# Patient Record
Sex: MALE | Race: ASIAN | NOT HISPANIC OR LATINO | URBAN - METROPOLITAN AREA
[De-identification: names, ages, dates, MRNs, and addresses within clinical notes are randomized per-mention and may not be internally consistent; named-entity substitution may affect disease eponyms.]

---

## 2020-08-22 ENCOUNTER — INPATIENT (INPATIENT)
Facility: HOSPITAL | Age: 60
LOS: 1 days | Discharge: HOME | End: 2020-08-24
Attending: INTERNAL MEDICINE | Admitting: INTERNAL MEDICINE
Payer: MEDICARE

## 2020-08-22 ENCOUNTER — INPATIENT (INPATIENT)
Facility: HOSPITAL | Age: 60
LOS: 0 days | Discharge: ANOTHER HOSPITAL | DRG: 305 | End: 2020-08-22
Attending: STUDENT IN AN ORGANIZED HEALTH CARE EDUCATION/TRAINING PROGRAM | Admitting: STUDENT IN AN ORGANIZED HEALTH CARE EDUCATION/TRAINING PROGRAM
Payer: COMMERCIAL

## 2020-08-22 VITALS
HEART RATE: 74 BPM | RESPIRATION RATE: 16 BRPM | TEMPERATURE: 98 F | WEIGHT: 137.79 LBS | SYSTOLIC BLOOD PRESSURE: 159 MMHG | DIASTOLIC BLOOD PRESSURE: 93 MMHG | HEIGHT: 66.93 IN | OXYGEN SATURATION: 98 %

## 2020-08-22 VITALS
OXYGEN SATURATION: 100 % | RESPIRATION RATE: 19 BRPM | HEIGHT: 66 IN | TEMPERATURE: 98 F | SYSTOLIC BLOOD PRESSURE: 139 MMHG | DIASTOLIC BLOOD PRESSURE: 77 MMHG | HEART RATE: 70 BPM | WEIGHT: 138.89 LBS

## 2020-08-22 VITALS
DIASTOLIC BLOOD PRESSURE: 69 MMHG | SYSTOLIC BLOOD PRESSURE: 113 MMHG | RESPIRATION RATE: 20 BRPM | HEART RATE: 63 BPM | OXYGEN SATURATION: 99 %

## 2020-08-22 DIAGNOSIS — I21.4 NON-ST ELEVATION (NSTEMI) MYOCARDIAL INFARCTION: ICD-10-CM

## 2020-08-22 DIAGNOSIS — I25.10 ATHEROSCLEROTIC HEART DISEASE OF NATIVE CORONARY ARTERY WITHOUT ANGINA PECTORIS: ICD-10-CM

## 2020-08-22 DIAGNOSIS — F17.200 NICOTINE DEPENDENCE, UNSPECIFIED, UNCOMPLICATED: ICD-10-CM

## 2020-08-22 DIAGNOSIS — Z29.9 ENCOUNTER FOR PROPHYLACTIC MEASURES, UNSPECIFIED: ICD-10-CM

## 2020-08-22 DIAGNOSIS — I10 ESSENTIAL (PRIMARY) HYPERTENSION: ICD-10-CM

## 2020-08-22 DIAGNOSIS — E11.9 TYPE 2 DIABETES MELLITUS WITHOUT COMPLICATIONS: ICD-10-CM

## 2020-08-22 DIAGNOSIS — K21.9 GASTRO-ESOPHAGEAL REFLUX DISEASE WITHOUT ESOPHAGITIS: ICD-10-CM

## 2020-08-22 DIAGNOSIS — E78.5 HYPERLIPIDEMIA, UNSPECIFIED: ICD-10-CM

## 2020-08-22 LAB
ALBUMIN SERPL ELPH-MCNC: 3.5 G/DL — SIGNIFICANT CHANGE UP (ref 3.5–5)
ALP SERPL-CCNC: 69 U/L — SIGNIFICANT CHANGE UP (ref 40–120)
ALT FLD-CCNC: 21 U/L DA — SIGNIFICANT CHANGE UP (ref 10–60)
ANION GAP SERPL CALC-SCNC: 7 MMOL/L — SIGNIFICANT CHANGE UP (ref 5–17)
APTT BLD: 35.4 SEC — SIGNIFICANT CHANGE UP (ref 27.5–35.5)
AST SERPL-CCNC: 27 U/L — SIGNIFICANT CHANGE UP (ref 10–40)
BASOPHILS # BLD AUTO: 0.07 K/UL — SIGNIFICANT CHANGE UP (ref 0–0.2)
BASOPHILS NFR BLD AUTO: 0.8 % — SIGNIFICANT CHANGE UP (ref 0–2)
BILIRUB SERPL-MCNC: 0.6 MG/DL — SIGNIFICANT CHANGE UP (ref 0.2–1.2)
BUN SERPL-MCNC: 8 MG/DL — SIGNIFICANT CHANGE UP (ref 7–18)
CALCIUM SERPL-MCNC: 8.8 MG/DL — SIGNIFICANT CHANGE UP (ref 8.4–10.5)
CHLORIDE SERPL-SCNC: 102 MMOL/L — SIGNIFICANT CHANGE UP (ref 96–108)
CO2 SERPL-SCNC: 25 MMOL/L — SIGNIFICANT CHANGE UP (ref 22–31)
CREAT SERPL-MCNC: 1.03 MG/DL — SIGNIFICANT CHANGE UP (ref 0.5–1.3)
D DIMER BLD IA.RAPID-MCNC: <150 NG/ML DDU — SIGNIFICANT CHANGE UP
EOSINOPHIL # BLD AUTO: 0.22 K/UL — SIGNIFICANT CHANGE UP (ref 0–0.5)
EOSINOPHIL NFR BLD AUTO: 2.4 % — SIGNIFICANT CHANGE UP (ref 0–6)
GLUCOSE BLDC GLUCOMTR-MCNC: 147 MG/DL — HIGH (ref 70–99)
GLUCOSE SERPL-MCNC: 142 MG/DL — HIGH (ref 70–99)
HCT VFR BLD CALC: 37.2 % — LOW (ref 39–50)
HGB BLD-MCNC: 12 G/DL — LOW (ref 13–17)
IMM GRANULOCYTES NFR BLD AUTO: 0.2 % — SIGNIFICANT CHANGE UP (ref 0–1.5)
INR BLD: 1.01 RATIO — SIGNIFICANT CHANGE UP (ref 0.88–1.16)
LYMPHOCYTES # BLD AUTO: 1.68 K/UL — SIGNIFICANT CHANGE UP (ref 1–3.3)
LYMPHOCYTES # BLD AUTO: 18 % — SIGNIFICANT CHANGE UP (ref 13–44)
MCHC RBC-ENTMCNC: 27.3 PG — SIGNIFICANT CHANGE UP (ref 27–34)
MCHC RBC-ENTMCNC: 32.3 GM/DL — SIGNIFICANT CHANGE UP (ref 32–36)
MCV RBC AUTO: 84.7 FL — SIGNIFICANT CHANGE UP (ref 80–100)
MONOCYTES # BLD AUTO: 0.59 K/UL — SIGNIFICANT CHANGE UP (ref 0–0.9)
MONOCYTES NFR BLD AUTO: 6.3 % — SIGNIFICANT CHANGE UP (ref 2–14)
NEUTROPHILS # BLD AUTO: 6.73 K/UL — SIGNIFICANT CHANGE UP (ref 1.8–7.4)
NEUTROPHILS NFR BLD AUTO: 72.3 % — SIGNIFICANT CHANGE UP (ref 43–77)
NRBC # BLD: 0 /100 WBCS — SIGNIFICANT CHANGE UP (ref 0–0)
NT-PROBNP SERPL-SCNC: 488 PG/ML — HIGH (ref 0–125)
PLATELET # BLD AUTO: 203 K/UL — SIGNIFICANT CHANGE UP (ref 150–400)
POTASSIUM SERPL-MCNC: 4.2 MMOL/L — SIGNIFICANT CHANGE UP (ref 3.5–5.3)
POTASSIUM SERPL-SCNC: 4.2 MMOL/L — SIGNIFICANT CHANGE UP (ref 3.5–5.3)
PROT SERPL-MCNC: 7 G/DL — SIGNIFICANT CHANGE UP (ref 6–8.3)
PROTHROM AB SERPL-ACNC: 11.8 SEC — SIGNIFICANT CHANGE UP (ref 10.6–13.6)
RBC # BLD: 4.39 M/UL — SIGNIFICANT CHANGE UP (ref 4.2–5.8)
RBC # FLD: 12.7 % — SIGNIFICANT CHANGE UP (ref 10.3–14.5)
SARS-COV-2 RNA SPEC QL NAA+PROBE: SIGNIFICANT CHANGE UP
SODIUM SERPL-SCNC: 134 MMOL/L — LOW (ref 135–145)
TROPONIN I SERPL-MCNC: 1.39 NG/ML — HIGH (ref 0–0.04)
WBC # BLD: 9.31 K/UL — SIGNIFICANT CHANGE UP (ref 3.8–10.5)
WBC # FLD AUTO: 9.31 K/UL — SIGNIFICANT CHANGE UP (ref 3.8–10.5)

## 2020-08-22 PROCEDURE — 87635 SARS-COV-2 COVID-19 AMP PRB: CPT

## 2020-08-22 PROCEDURE — 84484 ASSAY OF TROPONIN QUANT: CPT

## 2020-08-22 PROCEDURE — 92941 PRQ TRLML REVSC TOT OCCL AMI: CPT | Mod: LD

## 2020-08-22 PROCEDURE — 99223 1ST HOSP IP/OBS HIGH 75: CPT | Mod: GC

## 2020-08-22 PROCEDURE — 93005 ELECTROCARDIOGRAM TRACING: CPT

## 2020-08-22 PROCEDURE — 85730 THROMBOPLASTIN TIME PARTIAL: CPT

## 2020-08-22 PROCEDURE — 71046 X-RAY EXAM CHEST 2 VIEWS: CPT

## 2020-08-22 PROCEDURE — 86769 SARS-COV-2 COVID-19 ANTIBODY: CPT

## 2020-08-22 PROCEDURE — 83880 ASSAY OF NATRIURETIC PEPTIDE: CPT

## 2020-08-22 PROCEDURE — 93458 L HRT ARTERY/VENTRICLE ANGIO: CPT | Mod: 26,59

## 2020-08-22 PROCEDURE — 85610 PROTHROMBIN TIME: CPT

## 2020-08-22 PROCEDURE — 36415 COLL VENOUS BLD VENIPUNCTURE: CPT

## 2020-08-22 PROCEDURE — 99223 1ST HOSP IP/OBS HIGH 75: CPT

## 2020-08-22 PROCEDURE — 85027 COMPLETE CBC AUTOMATED: CPT

## 2020-08-22 PROCEDURE — 96374 THER/PROPH/DIAG INJ IV PUSH: CPT

## 2020-08-22 PROCEDURE — 99285 EMERGENCY DEPT VISIT HI MDM: CPT

## 2020-08-22 PROCEDURE — 99053 MED SERV 10PM-8AM 24 HR FAC: CPT

## 2020-08-22 PROCEDURE — 80053 COMPREHEN METABOLIC PANEL: CPT

## 2020-08-22 PROCEDURE — 71046 X-RAY EXAM CHEST 2 VIEWS: CPT | Mod: 26

## 2020-08-22 PROCEDURE — 96375 TX/PRO/DX INJ NEW DRUG ADDON: CPT

## 2020-08-22 PROCEDURE — 99285 EMERGENCY DEPT VISIT HI MDM: CPT | Mod: 25

## 2020-08-22 PROCEDURE — 85379 FIBRIN DEGRADATION QUANT: CPT

## 2020-08-22 RX ORDER — OMEPRAZOLE 10 MG/1
1 CAPSULE, DELAYED RELEASE ORAL
Qty: 0 | Refills: 0 | DISCHARGE

## 2020-08-22 RX ORDER — HEPARIN SODIUM 5000 [USP'U]/ML
3800 INJECTION INTRAVENOUS; SUBCUTANEOUS ONCE
Refills: 0 | Status: COMPLETED | OUTPATIENT
Start: 2020-08-22 | End: 2020-08-22

## 2020-08-22 RX ORDER — MORPHINE SULFATE 50 MG/1
6 CAPSULE, EXTENDED RELEASE ORAL ONCE
Refills: 0 | Status: DISCONTINUED | OUTPATIENT
Start: 2020-08-22 | End: 2020-08-22

## 2020-08-22 RX ORDER — ASPIRIN/CALCIUM CARB/MAGNESIUM 324 MG
324 TABLET ORAL ONCE
Refills: 0 | Status: COMPLETED | OUTPATIENT
Start: 2020-08-22 | End: 2020-08-22

## 2020-08-22 RX ORDER — NITROGLYCERIN 6.5 MG
0.4 CAPSULE, EXTENDED RELEASE ORAL ONCE
Refills: 0 | Status: COMPLETED | OUTPATIENT
Start: 2020-08-22 | End: 2020-08-22

## 2020-08-22 RX ORDER — INSULIN LISPRO 100/ML
VIAL (ML) SUBCUTANEOUS
Refills: 0 | Status: DISCONTINUED | OUTPATIENT
Start: 2020-08-22 | End: 2020-08-24

## 2020-08-22 RX ORDER — GLUCAGON INJECTION, SOLUTION 0.5 MG/.1ML
1 INJECTION, SOLUTION SUBCUTANEOUS ONCE
Refills: 0 | Status: DISCONTINUED | OUTPATIENT
Start: 2020-08-22 | End: 2020-08-24

## 2020-08-22 RX ORDER — HEPARIN SODIUM 5000 [USP'U]/ML
3800 INJECTION INTRAVENOUS; SUBCUTANEOUS EVERY 6 HOURS
Refills: 0 | Status: DISCONTINUED | OUTPATIENT
Start: 2020-08-22 | End: 2020-08-22

## 2020-08-22 RX ORDER — DEXTROSE 50 % IN WATER 50 %
12.5 SYRINGE (ML) INTRAVENOUS ONCE
Refills: 0 | Status: DISCONTINUED | OUTPATIENT
Start: 2020-08-22 | End: 2020-08-24

## 2020-08-22 RX ORDER — HEPARIN SODIUM 5000 [USP'U]/ML
750 INJECTION INTRAVENOUS; SUBCUTANEOUS
Qty: 25000 | Refills: 0 | Status: DISCONTINUED | OUTPATIENT
Start: 2020-08-22 | End: 2020-08-22

## 2020-08-22 RX ORDER — METOPROLOL TARTRATE 50 MG
25 TABLET ORAL
Refills: 0 | Status: DISCONTINUED | OUTPATIENT
Start: 2020-08-22 | End: 2020-08-22

## 2020-08-22 RX ORDER — TICAGRELOR 90 MG/1
180 TABLET ORAL ONCE
Refills: 0 | Status: COMPLETED | OUTPATIENT
Start: 2020-08-22 | End: 2020-08-22

## 2020-08-22 RX ORDER — ATORVASTATIN CALCIUM 80 MG/1
80 TABLET, FILM COATED ORAL ONCE
Refills: 0 | Status: COMPLETED | OUTPATIENT
Start: 2020-08-22 | End: 2020-08-22

## 2020-08-22 RX ORDER — HEPARIN SODIUM 5000 [USP'U]/ML
INJECTION INTRAVENOUS; SUBCUTANEOUS
Qty: 25000 | Refills: 0 | Status: DISCONTINUED | OUTPATIENT
Start: 2020-08-22 | End: 2020-08-22

## 2020-08-22 RX ORDER — DEXTROSE 50 % IN WATER 50 %
15 SYRINGE (ML) INTRAVENOUS ONCE
Refills: 0 | Status: DISCONTINUED | OUTPATIENT
Start: 2020-08-22 | End: 2020-08-24

## 2020-08-22 RX ORDER — CHLORHEXIDINE GLUCONATE 213 G/1000ML
1 SOLUTION TOPICAL DAILY
Refills: 0 | Status: DISCONTINUED | OUTPATIENT
Start: 2020-08-22 | End: 2020-08-24

## 2020-08-22 RX ORDER — PANTOPRAZOLE SODIUM 20 MG/1
40 TABLET, DELAYED RELEASE ORAL
Refills: 0 | Status: DISCONTINUED | OUTPATIENT
Start: 2020-08-22 | End: 2020-08-24

## 2020-08-22 RX ORDER — HYDROCORTISONE 1 %
1 OINTMENT (GRAM) TOPICAL
Qty: 0 | Refills: 0 | DISCHARGE

## 2020-08-22 RX ORDER — SODIUM CHLORIDE 9 MG/ML
1000 INJECTION, SOLUTION INTRAVENOUS
Refills: 0 | Status: DISCONTINUED | OUTPATIENT
Start: 2020-08-22 | End: 2020-08-24

## 2020-08-22 RX ORDER — ASPIRIN/CALCIUM CARB/MAGNESIUM 324 MG
81 TABLET ORAL DAILY
Refills: 0 | Status: DISCONTINUED | OUTPATIENT
Start: 2020-08-22 | End: 2020-08-22

## 2020-08-22 RX ORDER — TICAGRELOR 90 MG/1
90 TABLET ORAL EVERY 12 HOURS
Refills: 0 | Status: DISCONTINUED | OUTPATIENT
Start: 2020-08-23 | End: 2020-08-24

## 2020-08-22 RX ORDER — DEXTROSE 50 % IN WATER 50 %
25 SYRINGE (ML) INTRAVENOUS ONCE
Refills: 0 | Status: DISCONTINUED | OUTPATIENT
Start: 2020-08-22 | End: 2020-08-24

## 2020-08-22 RX ORDER — INSULIN LISPRO 100/ML
VIAL (ML) SUBCUTANEOUS AT BEDTIME
Refills: 0 | Status: DISCONTINUED | OUTPATIENT
Start: 2020-08-22 | End: 2020-08-24

## 2020-08-22 RX ORDER — HEPARIN SODIUM 5000 [USP'U]/ML
5000 INJECTION INTRAVENOUS; SUBCUTANEOUS EVERY 8 HOURS
Refills: 0 | Status: DISCONTINUED | OUTPATIENT
Start: 2020-08-22 | End: 2020-08-24

## 2020-08-22 RX ORDER — ATORVASTATIN CALCIUM 80 MG/1
80 TABLET, FILM COATED ORAL AT BEDTIME
Refills: 0 | Status: DISCONTINUED | OUTPATIENT
Start: 2020-08-22 | End: 2020-08-24

## 2020-08-22 RX ORDER — ASPIRIN/CALCIUM CARB/MAGNESIUM 324 MG
81 TABLET ORAL DAILY
Refills: 0 | Status: DISCONTINUED | OUTPATIENT
Start: 2020-08-23 | End: 2020-08-24

## 2020-08-22 RX ADMIN — HEPARIN SODIUM 7.5 UNIT(S)/HR: 5000 INJECTION INTRAVENOUS; SUBCUTANEOUS at 11:55

## 2020-08-22 RX ADMIN — Medication 1 TABLET(S): at 17:09

## 2020-08-22 RX ADMIN — TICAGRELOR 180 MILLIGRAM(S): 90 TABLET ORAL at 11:55

## 2020-08-22 RX ADMIN — HEPARIN SODIUM 5000 UNIT(S): 5000 INJECTION INTRAVENOUS; SUBCUTANEOUS at 21:08

## 2020-08-22 RX ADMIN — HEPARIN SODIUM 3800 UNIT(S): 5000 INJECTION INTRAVENOUS; SUBCUTANEOUS at 07:49

## 2020-08-22 RX ADMIN — HEPARIN SODIUM 750 UNIT(S)/HR: 5000 INJECTION INTRAVENOUS; SUBCUTANEOUS at 07:48

## 2020-08-22 RX ADMIN — Medication 324 MILLIGRAM(S): at 06:59

## 2020-08-22 RX ADMIN — PANTOPRAZOLE SODIUM 40 MILLIGRAM(S): 20 TABLET, DELAYED RELEASE ORAL at 21:08

## 2020-08-22 RX ADMIN — MORPHINE SULFATE 6 MILLIGRAM(S): 50 CAPSULE, EXTENDED RELEASE ORAL at 07:46

## 2020-08-22 RX ADMIN — ATORVASTATIN CALCIUM 80 MILLIGRAM(S): 80 TABLET, FILM COATED ORAL at 09:47

## 2020-08-22 RX ADMIN — ATORVASTATIN CALCIUM 80 MILLIGRAM(S): 80 TABLET, FILM COATED ORAL at 21:08

## 2020-08-22 RX ADMIN — Medication 0.4 MILLIGRAM(S): at 06:58

## 2020-08-22 RX ADMIN — MORPHINE SULFATE 6 MILLIGRAM(S): 50 CAPSULE, EXTENDED RELEASE ORAL at 07:58

## 2020-08-22 NOTE — ACUTE INTERFACILITY TRANSFER NOTE - HOSPITAL COURSE
Patient is a 59 year old male AAOx3 from home, with PMH of HTN, HLD, DM, and CAD s/p 2 stents in 2005, who presented to the ED due to chest pain.  Noted to have troponin of 1.390 in ED. EKG was NSR. Started on heparin drip. Given dose of aspirin, morphine and nitro sublingual.  Admitted for NSTEMI. Plan for transfer to [facility] CCU for possible cardiac cath.

## 2020-08-22 NOTE — H&P ADULT - HISTORY OF PRESENT ILLNESS
Patient is a 59 year old male with PMH of HTN, HLD, DM, and CAD s/p 2 stents in 2005, who presented to [facility] due to chest pain. Patient states that the chest pain started around 4pm yesterday and radiated to both arms but pain worse on left compared to right. States having associated dizziness as well. States he had chest pain like this previously which led to him getting stents. EKG 12 lead done in ER at OSH revealing ST depressions in V4, V5, V6  with elevation in troponin I. Patient was initiated on ACS protocol, heparin gtt and asa load of 325. Patient was urgently transferred to [facility] CCU for further evaluation. Patient was then loaded with brilinta 180 and taken for urgent cardiac catheterization.

## 2020-08-22 NOTE — H&P ADULT - NSICDXPASTMEDICALHX_GEN_ALL_CORE_FT
PAST MEDICAL HISTORY:  CAD (coronary artery disease) s/p 2 stents    DM (diabetes mellitus)     HLD (hyperlipidemia)     HTN (hypertension)

## 2020-08-22 NOTE — CONSULT NOTE ADULT - ASSESSMENT
Patient is a 59 year old male AAOx3 from home, with PMH of HTN, HLD, DM, and CAD s/p 2 stents in 2005, who presented to the ED due to chest pain.    Problem/Plan - 1:  ·  Problem: NSTEMI (non-ST elevated myocardial infarction).  Plan: patient presented with chest pain radiating to arms  troponin of 1.390 on admission  EKG showing NSR  started on heparin drip  -recieved aspirin,statin  -Plavix/Brilinta held at present  -For transfer to [facility] CCU for cath today-Dr Ras Ervin      Problem/Plan - 2:  ·  Problem: HTN (hypertension).  Plan: BBlockers  BP stable at present      Problem/Plan - 3:  ·  Problem: HLD (hyperlipidemia).  Plan: continue statin.   Lipid profile in AM    Problem/Plan - 4:  ·  Problem: DM (diabetes mellitus).  Plan: monitor BS   SSI.     Problem/Plan - 5:  ·  Problem: CAD (coronary artery disease).  Plan: history of 2 stents in 2005  plan for transfer to [facility] for possible cardiac cath  -TTE LV Function.     Problem/Plan - 6:  Problem: Need for prophylactic measure. Plan: patient on heparin drip.

## 2020-08-22 NOTE — H&P ADULT - ATTENDING COMMENTS
Patient is a 59y old  Male who presents with a chief complaint of chest pain from yesterday afternoon. Substernal, 8/10, radiating to left arm, pressure like, aggravated by walking, no alleviating factor noted. Denies any SOB or palpitation but complains of diaphoresis. Denies any orthopnea, PND or recent leg swelling. Patient had CAD s/p stent in 2005, was on DAPT, but plavix was held on Aug 15 by PCP. Denies fever, chills, abdominal pain, nausea, vomiting, diarrhea, constipation, dizziness. His primary cardiologist is Dr Basurto, last visit 1year ago. Patient does not remember when was last stress test done.     VS: stable    PHYSICAL EXAM:  GENERAL: NAD  NERVOUS SYSTEM:  Alert & Oriented X3, Good concentration; no focal deficit   CHEST/LUNG: Clear to auscultation bilaterally; No rales, rhonchi, wheezing, or rubs  HEART: Regular rate and rhythm; No murmurs, rubs, or gallops  ABDOMEN: Soft, Nontender, Nondistended; Bowel sounds present  EXTREMITIES:  2+ Peripheral Pulses, No clubbing, cyanosis, or edema  SKIN: No rashes or lesions    LABS:                        12.0   9.31  )-----------( 203      ( 22 Aug 2020 06:36 )             37.2     134<L>  |  102  |  8   ----------------------------<  142<H>  4.2   |  25  |  1.03    Troponin I, Serum: 1.390    Assessment and plan:  NSTEMI  CAD s/p stent in 2005  HTN  HLD  DM    Plan:   Aspirin, statin and heparin drip   Cont BB  Trend trops  Serial EKG   Sublingual NTG PRN for chest pain   Appreciate cardiology Dr Rodriguez's consult  Full code   Transferred to [facility] for cath

## 2020-08-22 NOTE — H&P ADULT - ASSESSMENT
Patient is a 59 year old male AAOx3 from home, with PMH of HTN, HLD, DM, and CAD s/p 2 stents in 2005, who presented to the ED due to chest pain. Transferred to [facility] for NSTEMI and for urgent cardiac catheterization

## 2020-08-22 NOTE — H&P ADULT - NSHPSOCIALHISTORY_GEN_ALL_CORE
Patient lives at home with his wife. States he smokes 5 cigarettes a day. Denies any alcohol or use of illicit drugs.

## 2020-08-22 NOTE — ED PROVIDER NOTE - OBJECTIVE STATEMENT
58 yo m pmh htn, dm, hld, cad s/p stents x 2, c/o CP since 4pm. Pt reports constant substernal CP radiating to b/l UE left worse than right, associated w/ dyspnea. Pt denies any n/v/d, diaphoresis, f/c/ns, abdominal pain, LE pain or swelling or other complaints.

## 2020-08-22 NOTE — CONSULT NOTE ADULT - ATTENDING COMMENTS
Patient was seen and examined by me on 08/22/2020,interim events noted,labs and radiology studies reviewed.  Iain Rodriguez MD,FACC.  [address] [place] [address].  [facility].  [phone]

## 2020-08-22 NOTE — H&P ADULT - PROBLEM SELECTOR PLAN 2
-Awaiting LHC  -Holding BB for now, will resume post cath  -Holding losartan 100 for now, will resume prior to discharge

## 2020-08-22 NOTE — CHART NOTE - NSCHARTNOTEFT_GEN_A_CORE
I was informed about patient (with chest pain and elevated troponin) by Dr Retana around 7:45 am. Requested Cardio recs before accepting admission given active chest pain and elevated troponin. Patient was signed out to Dr Godinez around 8 am, No cardio was called till that time per ED .

## 2020-08-22 NOTE — H&P ADULT - HISTORY OF PRESENT ILLNESS
Patient is a 59 year old male AAOx3 from home, with PMH of HTN, HLD, DM, and CAD s/p 2 stents in 2005, who presented to the ED due to chest pain. Patient states that the chest pain started around 4pm yesterday and radiated to both arms but pain worse on left compared to right. States having associated dizziness as well. States he had chest pain like this previously which led to him getting stents. Currently states he is feeling a little better. Denies any shortness of breath, abdominal pain, nausea or vomiting.

## 2020-08-22 NOTE — H&P ADULT - NSHPPHYSICALEXAM_GEN_ALL_CORE
PHYSICAL EXAM:    GENERAL: NAD, well-developed  HEAD:  Atraumatic, Normocephalic  EYES: EOMI, PERRLA, conjunctiva and sclera clear  NECK: Supple, No JVD  CHEST/LUNG: Clear to auscultation bilaterally; No rhonchis, rales, or wheezing  HEART: Regular rate and rhythm; S1, S2; No murmurs, rubs, or gallops  ABDOMEN: Soft, Nontender, Nondistended; Normoactive bowel sounds  EXTREMITIES:  2+ Peripheral Pulses, No clubbing, cyanosis, or edema  PSYCH: A&Ox3  NEUROLOGY: non-focal  SKIN: No rashes or lesions

## 2020-08-22 NOTE — H&P ADULT - PROBLEM SELECTOR PLAN 1
-Admit to CCU for continuous tele monitoring  -Admission labs to include CMP with mag and phos, CBC, Coags, lipid profile, HGBA1c, TSH, serial cardiac enzymes  -Patient is awaiting urgent cardiac catheterization  -s/p ASA and brilinta load, will resume asa 81 and brilinta 90 bid in am  continue lipitor 80  -Will resume BB and ARB post cardiac cath

## 2020-08-22 NOTE — ACUTE INTERFACILITY TRANSFER NOTE - PLAN OF CARE
Cardiac cath presented with chest pain  troponin elevated at 1.390  started on heparin drip  EKG showing NSR  monitor on telemetry  plan for cardiac cath at [facility] stabilize blood pressure patient takes losartan 100mg and metoprolol tartrate 50mg BID  monitor blood pressure and adjust meds as needed monitor lipid panel patient takes simvastatin 20mg at home  f/u lipid panel control blood sugars patient takes janumet  BID and glipizide XL 5mg at home  monitor blood sugars  f/u A1c

## 2020-08-22 NOTE — H&P ADULT - PROBLEM SELECTOR PLAN 1
patient presented with chest pain radiating to arms  troponin of 1.390 on admission  EKG showing NSR  started on heparin drip  placed on telemetry patient presented with chest pain radiating to arms  troponin of 1.390 on admission  EKG showing NSR  started on heparin drip  placed on telemetry  plan for transfer to [facility] for possible Cardiac cath

## 2020-08-22 NOTE — ED PROVIDER NOTE - PHYSICAL EXAMINATION
General: pt lying in stretcher, appears stated age and is not in distress  HEENT: AT/NC, pink conjunctiva, anicteric sclerae, EOMI, PERRLA, mmm  Neck: supple, full ROM, trachea midline, no JVD, no cervical LAD, no midline ttp or stepoffs  Lungs: symmetric excursion, b/l clear vesicular breath sounds with no wheezes, crackles, or rhonchi  Heart: rrr, S1, S2 normal; no S3 or S4; no murmurs or rubs  Abd: normal bowel sounds; soft, nontender; negative McBurney's point tenderness, negative Blanco's sign, no rebound, no guarding, spleen non-palpable; no hepatomegaly, no masses  Back: no midline spinal tenderness or stepoffs, no costovertebral angle tenderness  Extremities: no clubbing, cyanosis, or edema; no palpable deformities or fractures  Skin: good turgor; no rashes, petechiae, ecchymoses, or jaundice  Pulses: radial, posterior tibialis (PT), dorsalis pedis (DP) all 2+ & symmetric  Neuro: awake, alert, responsive; oriented to person, place and time; cranial nerves intact, EOMI, intact jaw movement, intact facial sensation, no facial asymmetry, hearing intact; no nystagmus, tongue midline; Motor: Normal tone in upper and lower extremities bilaterally strength 5/5; Sensory: intact

## 2020-08-22 NOTE — CONSULT NOTE ADULT - SUBJECTIVE AND OBJECTIVE BOX
DATE OF SERVICE:Patient was seen,examined and evaluated on-08/22/2020    CHIEF COMPLAINT:Chest pain    HPI:HPI:  Patient is a 59 year old male AAOx3 from home, with PMH of HTN, HLD, T2DM, and CAD s/p 2 stents in 2005, who presented to the ED due to chest pain. Patient states that the chest pain started around 4pm yesterday and radiated to both arms but pain worse on left compared to right. States having associated dizziness as well. States he had chest pain like this previously which led to him getting stents. Currently states he is feeling a little better. Denies any shortness of breath, abdominal pain, nausea or vomiting. Noted episodes interrmittent chest pain started on Heparin gtt.      PAST MEDICAL & SURGICAL HISTORY:  CAD (coronary artery disease): s/p 2 stents  DM (diabetes mellitus)  HLD (hyperlipidemia)  HTN (hypertension)      MEDICATIONS  (STANDING):  atorvastatin 80 milliGRAM(s) Oral once  heparin  Infusion.  Unit(s)/Hr (7.5 mL/Hr) IV Continuous <Continuous>    MEDICATIONS  (PRN):  heparin   Injectable 3800 Unit(s) IV Push every 6 hours PRN For aPTT less than 40      FAMILY HISTORY:  No family history of premature coronary artery disease or sudden cardiac death    SOCIAL HISTORY:  Smoking-Current Smoker  Alcohol-Denies  Ilicit Drug use-Denies    REVIEW OF SYSTEMS:  Constitutional: [ ] fever, [ ]weight loss, [ ]fatigue Activity [ ] Bedbound,[x ] Ambulates [x ] Unassisted[ ] Cane/Walker [ ] Assistence.  Eyes: [ ] visual changes  Respiratory: [x ]shortness of breath;  [ ] cough, [ ]wheezing, [ ]chills, [ ]hemoptysis  Cardiovascular: [x ] chest pain, [ ]palpitations, [ ]dizziness,  [ ]leg swelling[ ]orthopnea [ ]PND  Gastrointestinal: [ ] abdominal pain, [ ]nausea, [ ]vomiting,  [ ]diarrhea,[ ]constipation  Genitourinary: [ ] dysuria, [ ] hematuria  Neurologic: [ ] headaches [ ] tremors[ ] weakness  Skin: [ ] itching, [ ]burning, [ ] rashes  Endocrine: [ ] heat or cold intolerance  Musculoskeletal: [ ] joint pain or swelling; [ ] muscle, back, or extremity pain  Psychiatric: [ ] depression, [ ]anxiety, [ ]mood swings, or [ ]difficulty sleeping  Hematologic: [ ] easy bruising, [ ] bleeding gums       [ x] All others negative	  [ ] Unable to obtain    Vital Signs Last 24 Hrs  T(C): 36.7 (22 Aug 2020 07:19), Max: 36.7 (22 Aug 2020 06:01)  T(F): 98 (22 Aug 2020 07:19), Max: 98 (22 Aug 2020 06:01)  HR: 63 (22 Aug 2020 07:32) (63 - 74)  BP: 113/69 (22 Aug 2020 07:32) (113/69 - 159/93)  RR: 20 (22 Aug 2020 07:32) (16 - 20)  SpO2: 99% (22 Aug 2020 07:32) (97% - 99%)  I&O's Summary      PHYSICAL EXAM:  General: No acute distress BMI-21.6  HEENT: EOMI, PERRL[ ] Icteric  Neck: Supple, No JVD  Lungs: Equal air entry bilaterally; [ ] Rales [ ] Rhonchi [ ] Wheezing  Heart: Regular rate and rhythm;[x ] Murmurs-  2 /6 [x ] Systolic [ ] Diastolic [ ] Radiation,No rubs, or gallops  Abdomen: Nontender, bowel sounds present  Extremities: No clubbing, cyanosis, or edema[ ] Calf tenderness  Nervous system:  Alert & Oriented X3, no focal deficits  Psychiatric: Normal affect  Skin: No rashes or lesions      LABS:  08-22    134<L>  |  102  |  8   ----------------------------<  142<H>  4.2   |  25  |  1.03    Ca    8.8      22 Aug 2020 06:36    TPro  7.0  /  Alb  3.5  /  TBili  0.6  /  DBili  x   /  AST  27  /  ALT  21  /  AlkPhos  69  08-22    Creatinine Trend: 1.03<--                        12.0   9.31  )-----------( 203      ( 22 Aug 2020 06:36 )             37.2     PT/INR - ( 22 Aug 2020 06:36 )   PT: 11.8 sec;   INR: 1.01 ratio    PTT - ( 22 Aug 2020 06:36 )  PTT:35.4 sec    Cardiac Enzymes: CARDIAC MARKERS ( 22 Aug 2020 06:36 )  1.390 ng/mL / x     / x     / x     / x          Serum Pro-Brain Natriuretic Peptide: 488 pg/mL (08-22-20 @ 06:36)    D-Dimer Assay, Quantitative (08.22.20 @ 06:36)    D-Dimer Assay, Quantitative: <150 ng/mL DDU        ECG [my interpretation]:Sinus rhythm at 73 BPM Non specific ST abnormalities                                     Qtc 407ms  TELEMETRY:Sinus Rhythm

## 2020-08-23 DIAGNOSIS — Z29.9 ENCOUNTER FOR PROPHYLACTIC MEASURES, UNSPECIFIED: ICD-10-CM

## 2020-08-23 LAB
ALBUMIN SERPL ELPH-MCNC: 3.9 G/DL — SIGNIFICANT CHANGE UP (ref 3.3–5)
ALP SERPL-CCNC: 63 U/L — SIGNIFICANT CHANGE UP (ref 40–120)
ALT FLD-CCNC: 19 U/L — SIGNIFICANT CHANGE UP (ref 4–41)
ANION GAP SERPL CALC-SCNC: 14 MMO/L — SIGNIFICANT CHANGE UP (ref 7–14)
AST SERPL-CCNC: 73 U/L — HIGH (ref 4–40)
BASOPHILS # BLD AUTO: 0.05 K/UL — SIGNIFICANT CHANGE UP (ref 0–0.2)
BASOPHILS NFR BLD AUTO: 0.6 % — SIGNIFICANT CHANGE UP (ref 0–2)
BILIRUB SERPL-MCNC: 0.5 MG/DL — SIGNIFICANT CHANGE UP (ref 0.2–1.2)
BUN SERPL-MCNC: 10 MG/DL — SIGNIFICANT CHANGE UP (ref 7–23)
CALCIUM SERPL-MCNC: 9.3 MG/DL — SIGNIFICANT CHANGE UP (ref 8.4–10.5)
CHLORIDE SERPL-SCNC: 99 MMOL/L — SIGNIFICANT CHANGE UP (ref 98–107)
CHOLEST SERPL-MCNC: 108 MG/DL — LOW (ref 120–199)
CK MB BLD-MCNC: 26.1 NG/ML — HIGH (ref 1–6.6)
CK MB BLD-MCNC: 49.47 NG/ML — HIGH (ref 1–6.6)
CK MB BLD-MCNC: 5.8 — HIGH (ref 0–2.5)
CK MB BLD-MCNC: 6.9 — HIGH (ref 0–2.5)
CK SERPL-CCNC: 451 U/L — HIGH (ref 30–200)
CK SERPL-CCNC: 715 U/L — HIGH (ref 30–200)
CO2 SERPL-SCNC: 20 MMOL/L — LOW (ref 22–31)
CREAT SERPL-MCNC: 0.82 MG/DL — SIGNIFICANT CHANGE UP (ref 0.5–1.3)
EOSINOPHIL # BLD AUTO: 0.2 K/UL — SIGNIFICANT CHANGE UP (ref 0–0.5)
EOSINOPHIL NFR BLD AUTO: 2.3 % — SIGNIFICANT CHANGE UP (ref 0–6)
GLUCOSE BLDC GLUCOMTR-MCNC: 147 MG/DL — HIGH (ref 70–99)
GLUCOSE BLDC GLUCOMTR-MCNC: 169 MG/DL — HIGH (ref 70–99)
GLUCOSE BLDC GLUCOMTR-MCNC: 196 MG/DL — HIGH (ref 70–99)
GLUCOSE BLDC GLUCOMTR-MCNC: 228 MG/DL — HIGH (ref 70–99)
GLUCOSE SERPL-MCNC: 183 MG/DL — HIGH (ref 70–99)
HBA1C BLD-MCNC: 8.1 % — HIGH (ref 4–5.6)
HCT VFR BLD CALC: 35.6 % — LOW (ref 39–50)
HCV AB S/CO SERPL IA: 0.06 S/CO — SIGNIFICANT CHANGE UP (ref 0–0.99)
HCV AB SERPL-IMP: SIGNIFICANT CHANGE UP
HDLC SERPL-MCNC: 29 MG/DL — LOW (ref 35–55)
HGB BLD-MCNC: 11.7 G/DL — LOW (ref 13–17)
IMM GRANULOCYTES NFR BLD AUTO: 0.2 % — SIGNIFICANT CHANGE UP (ref 0–1.5)
LIPID PNL WITH DIRECT LDL SERPL: 63 MG/DL — SIGNIFICANT CHANGE UP
LYMPHOCYTES # BLD AUTO: 2.01 K/UL — SIGNIFICANT CHANGE UP (ref 1–3.3)
LYMPHOCYTES # BLD AUTO: 22.8 % — SIGNIFICANT CHANGE UP (ref 13–44)
MAGNESIUM SERPL-MCNC: 1.9 MG/DL — SIGNIFICANT CHANGE UP (ref 1.6–2.6)
MCHC RBC-ENTMCNC: 27.1 PG — SIGNIFICANT CHANGE UP (ref 27–34)
MCHC RBC-ENTMCNC: 32.9 % — SIGNIFICANT CHANGE UP (ref 32–36)
MCV RBC AUTO: 82.4 FL — SIGNIFICANT CHANGE UP (ref 80–100)
MONOCYTES # BLD AUTO: 0.65 K/UL — SIGNIFICANT CHANGE UP (ref 0–0.9)
MONOCYTES NFR BLD AUTO: 7.4 % — SIGNIFICANT CHANGE UP (ref 2–14)
NEUTROPHILS # BLD AUTO: 5.9 K/UL — SIGNIFICANT CHANGE UP (ref 1.8–7.4)
NEUTROPHILS NFR BLD AUTO: 66.7 % — SIGNIFICANT CHANGE UP (ref 43–77)
NRBC # FLD: 0 K/UL — SIGNIFICANT CHANGE UP (ref 0–0)
NT-PROBNP SERPL-SCNC: 1369 PG/ML — SIGNIFICANT CHANGE UP
PHOSPHATE SERPL-MCNC: 3.9 MG/DL — SIGNIFICANT CHANGE UP (ref 2.5–4.5)
PLATELET # BLD AUTO: 200 K/UL — SIGNIFICANT CHANGE UP (ref 150–400)
PMV BLD: 12.5 FL — SIGNIFICANT CHANGE UP (ref 7–13)
POTASSIUM SERPL-MCNC: 4.1 MMOL/L — SIGNIFICANT CHANGE UP (ref 3.5–5.3)
POTASSIUM SERPL-SCNC: 4.1 MMOL/L — SIGNIFICANT CHANGE UP (ref 3.5–5.3)
PROT SERPL-MCNC: 6.5 G/DL — SIGNIFICANT CHANGE UP (ref 6–8.3)
RBC # BLD: 4.32 M/UL — SIGNIFICANT CHANGE UP (ref 4.2–5.8)
RBC # FLD: 12.8 % — SIGNIFICANT CHANGE UP (ref 10.3–14.5)
SARS-COV-2 IGG SERPL QL IA: NEGATIVE — SIGNIFICANT CHANGE UP
SARS-COV-2 IGM SERPL IA-ACNC: 0.37 INDEX — SIGNIFICANT CHANGE UP
SODIUM SERPL-SCNC: 133 MMOL/L — LOW (ref 135–145)
TRIGL SERPL-MCNC: 225 MG/DL — HIGH (ref 10–149)
TROPONIN T, HIGH SENSITIVITY: 1350 NG/L — CRITICAL HIGH (ref ?–14)
TROPONIN T, HIGH SENSITIVITY: 807 NG/L — CRITICAL HIGH (ref ?–14)
TSH SERPL-MCNC: 2.96 UIU/ML — SIGNIFICANT CHANGE UP (ref 0.27–4.2)
WBC # BLD: 8.83 K/UL — SIGNIFICANT CHANGE UP (ref 3.8–10.5)
WBC # FLD AUTO: 8.83 K/UL — SIGNIFICANT CHANGE UP (ref 3.8–10.5)

## 2020-08-23 PROCEDURE — 71045 X-RAY EXAM CHEST 1 VIEW: CPT | Mod: 26

## 2020-08-23 PROCEDURE — 99233 SBSQ HOSP IP/OBS HIGH 50: CPT

## 2020-08-23 PROCEDURE — 93306 TTE W/DOPPLER COMPLETE: CPT | Mod: 26

## 2020-08-23 RX ORDER — POLYETHYLENE GLYCOL 3350 17 G/17G
17 POWDER, FOR SOLUTION ORAL ONCE
Refills: 0 | Status: COMPLETED | OUTPATIENT
Start: 2020-08-23 | End: 2020-08-23

## 2020-08-23 RX ORDER — METOPROLOL TARTRATE 50 MG
12.5 TABLET ORAL
Refills: 0 | Status: DISCONTINUED | OUTPATIENT
Start: 2020-08-23 | End: 2020-08-24

## 2020-08-23 RX ORDER — SENNA PLUS 8.6 MG/1
2 TABLET ORAL AT BEDTIME
Refills: 0 | Status: DISCONTINUED | OUTPATIENT
Start: 2020-08-23 | End: 2020-08-24

## 2020-08-23 RX ORDER — MAGNESIUM SULFATE 500 MG/ML
1 VIAL (ML) INJECTION ONCE
Refills: 0 | Status: COMPLETED | OUTPATIENT
Start: 2020-08-23 | End: 2020-08-23

## 2020-08-23 RX ADMIN — Medication 12.5 MILLIGRAM(S): at 17:10

## 2020-08-23 RX ADMIN — Medication 1 TABLET(S): at 17:10

## 2020-08-23 RX ADMIN — SENNA PLUS 2 TABLET(S): 8.6 TABLET ORAL at 21:19

## 2020-08-23 RX ADMIN — HEPARIN SODIUM 5000 UNIT(S): 5000 INJECTION INTRAVENOUS; SUBCUTANEOUS at 21:19

## 2020-08-23 RX ADMIN — Medication 81 MILLIGRAM(S): at 11:44

## 2020-08-23 RX ADMIN — ATORVASTATIN CALCIUM 80 MILLIGRAM(S): 80 TABLET, FILM COATED ORAL at 21:19

## 2020-08-23 RX ADMIN — Medication 4: at 11:45

## 2020-08-23 RX ADMIN — CHLORHEXIDINE GLUCONATE 1 APPLICATION(S): 213 SOLUTION TOPICAL at 11:44

## 2020-08-23 RX ADMIN — Medication 100 GRAM(S): at 08:01

## 2020-08-23 RX ADMIN — Medication 2: at 16:53

## 2020-08-23 RX ADMIN — PANTOPRAZOLE SODIUM 40 MILLIGRAM(S): 20 TABLET, DELAYED RELEASE ORAL at 06:12

## 2020-08-23 RX ADMIN — TICAGRELOR 90 MILLIGRAM(S): 90 TABLET ORAL at 06:12

## 2020-08-23 RX ADMIN — HEPARIN SODIUM 5000 UNIT(S): 5000 INJECTION INTRAVENOUS; SUBCUTANEOUS at 14:20

## 2020-08-23 RX ADMIN — HEPARIN SODIUM 5000 UNIT(S): 5000 INJECTION INTRAVENOUS; SUBCUTANEOUS at 06:12

## 2020-08-23 RX ADMIN — Medication 1 TABLET(S): at 06:12

## 2020-08-23 RX ADMIN — TICAGRELOR 90 MILLIGRAM(S): 90 TABLET ORAL at 17:10

## 2020-08-23 NOTE — DISCHARGE NOTE PROVIDER - CARE PROVIDER_API CALL
MAGGY CORTES  [id number] [place] [address]  Phone: [phone]  Fax: [phone]  Established Patient  Follow Up Time:     Tim Huddleston  CARDIOLOGY  [address] [facility], [facility] [address]  [place] [id number]  Phone: [phone]  Fax: [phone]  Established Patient  Follow Up Time:

## 2020-08-23 NOTE — PROGRESS NOTE ADULT - ATTENDING COMMENTS
Patient was seen and examined by me on 08/23/2020,interim events noted,labs and radiology studies reviewed.  Iain Rodriguez MD,FACC.  [address] [place] [address].  [facility].  [phone]
Patient seen and examined.  Plan formulated and reviewed during CCU rounds and as outlined above.

## 2020-08-23 NOTE — DISCHARGE NOTE PROVIDER - NSDCCPTREATMENT_GEN_ALL_CORE_FT
PRINCIPAL PROCEDURE  Procedure: Left heart catheterization  Findings and Treatment: Diag 1 99%- POBA to the diag 1      SECONDARY PROCEDURE  Procedure: Percutaneous coronary angioplasty  Findings and Treatment: POBA to OM1 90% w/ 40% residual

## 2020-08-23 NOTE — PROGRESS NOTE ADULT - SUBJECTIVE AND OBJECTIVE BOX
DATE OF SERVICE:Patient was seen and examined :08/23/2020    PRESENTING CC:Chest Pain    SUBJ:   59 year old male AAOx3 from home, with PMH of HTN, HLD, T2DM, and CAD s/p 2 stents in 2005, who presented to the ED due to chest pain,noted elevated troponins,transferred to [facility] urgent cardiac cath-s/p POBA D1 scheduled for staged PCI OM1 tomorrow no further chest pain    PMH -reviewed admission note, no change since admission  Heart failure: acute [ ] chronic [ ] acute or chronic [ ] diastolic [ ] systolic [ ] combined systolic and diastolic[ ]  ALAN: ATN[ ] renal medullary necrosis [ ] CKD I [ ]CKDII [ ]CKD III [ ]CKD IV [ ]CKD V [ ]Other pathological lesions [ ]    MEDICATIONS  (STANDING):  aspirin enteric coated 81 milliGRAM(s) Oral daily  atorvastatin 80 milliGRAM(s) Oral at bedtime  calcium carbonate 1250 mG  + Vitamin D (OsCal 500 + D) 1 Tablet(s) Oral two times a day  chlorhexidine 4% Liquid 1 Application(s) Topical daily  heparin   Injectable 5000 Unit(s) SubCutaneous every 8 hours  insulin lispro (HumaLOG) corrective regimen sliding scale   SubCutaneous three times a day before meals  insulin lispro (HumaLOG) corrective regimen sliding scale   SubCutaneous at bedtime  metoprolol tartrate 12.5 milliGRAM(s) Oral two times a day  pantoprazole    Tablet 40 milliGRAM(s) Oral before breakfast  ticagrelor 90 milliGRAM(s) Oral every 12 hours    MEDICATIONS  (PRN):  dextrose 40% Gel 15 Gram(s) Oral once PRN Blood Glucose LESS THAN 70 milliGRAM(s)/deciliter  glucagon  Injectable 1 milliGRAM(s) IntraMuscular once PRN Glucose LESS THAN 70 milligrams/deciliter              REVIEW OF SYSTEMS:  Constitutional: [ ] fever, [ ]weight loss,  [ ]fatigue  Eyes: [ ] visual changes  Respiratory: [ ]shortness of breath;  [ ] cough, [ ]wheezing, [ ]chills, [ ]hemoptysis  Cardiovascular: [ ] chest pain, [ ]palpitations, [ ]dizziness,  [ ]leg swelling[ ]orthopnea[ ]PND  Gastrointestinal: [ ] abdominal pain, [ ]nausea, [ ]vomiting,  [ ]diarrhea   Genitourinary: [ ] dysuria, [ ] hematuria  Neurologic: [ ] headaches [ ] tremors[ ]weakness  Skin: [ ] itching, [ ]burning, [ ] rashes  Endocrine: [ ] heat or cold intolerance  Musculoskeletal: [ ] joint pain or swelling; [ ] muscle, back, or extremity pain  Psychiatric: [ ] depression, [ ]anxiety, [ ]mood swings, or [ ]difficulty sleeping  Hematologic: [ ] easy bruising, [ ] bleeding gums    [x] All remaining systems negative except as per above.   [ ]Unable to obtain.    Vital Signs Last 24 Hrs  T(C): 36.5 (23 Aug 2020 16:00), Max: 36.8 (23 Aug 2020 00:00)  T(F): 97.7 (23 Aug 2020 16:00), Max: 98.3 (23 Aug 2020 08:00)  HR: 94 (23 Aug 2020 17:00) (70 - 99)  BP: 125/88 (23 Aug 2020 17:00) (88/54 - 130/84)  BP(mean): 100 (23 Aug 2020 17:00) (65 - 100)  RR: 28 (23 Aug 2020 17:00) (13 - 29)  SpO2: 98% (23 Aug 2020 07:00) (96% - 99%)  I&O's Summary    22 Aug 2020 07:01  -  23 Aug 2020 07:00  --------------------------------------------------------  IN: 457.5 mL / OUT: 1625 mL / NET: -1167.5 mL    23 Aug 2020 07:01  -  23 Aug 2020 18:13  --------------------------------------------------------  IN: 600 mL / OUT: 1290 mL / NET: -690 mL        PHYSICAL EXAM:  General: No acute distress BMI-26  HEENT: EOMI, PERRL  Neck: Supple, [ ] JVD  Lungs: Equal air entry bilaterally; [ ] rales [ ] wheezing [ ] rhonchi  Heart: Regular rate and rhythm; [x ] murmur 2  /6 [x] systolic [ ] diastolic [ ] radiation[ ] rubs [ ]  gallops  Abdomen: Nontender, bowel sounds present  Extremities: No clubbing, cyanosis, [ ] edema  Nervous system:  Alert & Oriented X3, no focal deficits  Psychiatric: Normal affect  Skin: No rashes or lesions    LABS:  08-23    133<L>  |  99  |  10  ----------------------------<  183<H>  4.1   |  20<L>  |  0.82    Ca    9.3      23 Aug 2020 03:00  Phos  3.9     08-23  Mg     1.9     08-23    TPro  6.5  /  Alb  3.9  /  TBili  0.5  /  DBili  x   /  AST  73<H>  /  ALT  19  /  AlkPhos  63  08-23    Creatinine Trend: 0.82<--, 1.03<--                        11.7   8.83  )-----------( 200      ( 23 Aug 2020 03:00 )             35.6     PT/INR - ( 22 Aug 2020 06:36 )   PT: 11.8 sec;   INR: 1.01 ratio         PTT - ( 22 Aug 2020 06:36 )  PTT:35.4 sec  Lipid Panel: Serum Pro-Brain Natriuretic Peptide: 1369 pg/mL (08-23 @ 03:00)    Cardiac Enzymes: CARDIAC MARKERS ( 23 Aug 2020 12:30 )  x     / x     / 451 u/L / 26.10 ng/mL / x      CARDIAC MARKERS ( 23 Aug 2020 03:00 )  x     / x     / 715 u/L / 49.47 ng/mL / x      CARDIAC MARKERS ( 22 Aug 2020 06:36 )  1.390 ng/mL / x     / x     / x     / x          Serum Pro-Brain Natriuretic Peptide: 1369 pg/mL (08-23-20 @ 03:00)  Serum Pro-Brain Natriuretic Peptide: 488 pg/mL (08-22-20 @ 06:36)    Troponin T, High Sensitivity (08.23.20 @ 12:30)    Troponin T, High Sensitivity: 807: Delta: 1350 ng/L on 08/23/    IMPRESSION AND PLAN:      Patient is a 59 year old male AAOx3 from home, with PMH of HTN, HLD, DM, and CAD s/p 2 stents in 2005, who p/w chest pain to [facility], then xferred to [facility] for NSTEMI and for urgent cardiac catheterization, [facility] 99% stenosis of D1 now s/p balloon angioplasty of D1, pending staged PCI of OM1.      Problem/Plan - 1:  ·  Problem: NSTEMI (non-ST elevated myocardial infarction).  Plan: Admitted to CCU for continuous tele monitoring  -s/p POBA D1 staged PCI-OM1  -Continue DAPT Brilinta/Aspirin      Problem/Plan - 2:  ·  Problem: CAD (coronary artery disease).  Plan: -Awaiting staged PCI of OM1  -Will c/w BB as tolerated  -Holding losartan 100 for now, will resume prior to discharge.     Problem/Plan - 3:  ·  Problem: DM (diabetes mellitus).  Plan: -HGBA1c 8.1  -FSqac and qhs  -Hold oral diabetic medications and c/w ISS.  POCT  Blood Glucose (08.23.20 @ 16:29)    POCT Blood Glucose.: 169 <---228 <---148 mg/dL        Problem/Plan - 4:  ·  Problem: HTN (hypertension).  Plan: -Holding ARB and will resume as tolerated.  - BP soft for now, will monitor and address accordingly.    Problem/Plan - 5:  ·  Problem: HLD (hyperlipidemia).  Plan: -Lipid Profile in AM (08.23.20 @ 03:00)    Cholesterol, Serum: 108 mg/dL    Triglycerides, Serum: 225 mg/dL    HDL Cholesterol, Serum: 29 mg/dL    Direct LDL: 63 mg/dL  -continue lipitor 80.     Problem/Plan - 6:  Problem: Smoker. Plan: -Smoking cessation information.    Problem/Plan - 7:  ·  Problem: GERD (gastroesophageal reflux disease).  Plan: protonix 40mg po qd.     Problem/Plan - 8:  ·  Problem: Prophylactic measure.  Plan: Anticoagulation: subQ hep  Diet: CC DASH TLC reg.

## 2020-08-23 NOTE — PROGRESS NOTE ADULT - PROBLEM SELECTOR PLAN 4
-Holding BB and ARB and will resume as tolerated. -Holding ARB and will resume as tolerated.  - BP soft for now, will monitor and address accordingly

## 2020-08-23 NOTE — DISCHARGE NOTE PROVIDER - NSDCCPCAREPLAN_GEN_ALL_CORE_FT
PRINCIPAL DISCHARGE DIAGNOSIS  Diagnosis: NSTEMI (non-ST elevated myocardial infarction)  Assessment and Plan of Treatment: You presented to the hospital with chest pain and trouble breathing. We placed stents in your heart because we found narrowing in the arteries supplying blood to your heart. We recommend you continue taking _______. Please follow up outpatient with your primary care provider as well as your Cardiologist ______. If you begin to have worsening chest pain or shortness of breath, please return to the hospital.      SECONDARY DISCHARGE DIAGNOSES  Diagnosis: DM (diabetes mellitus)  Assessment and Plan of Treatment: You have a history of diabetes PRINCIPAL DISCHARGE DIAGNOSIS  Diagnosis: NSTEMI (non-ST elevated myocardial infarction)  Assessment and Plan of Treatment: You presented to the hospital with chest pain and trouble breathing. We placed stents in your heart because we found narrowing in the arteries supplying blood to your heart. We recommend you continue taking aspirin, ticagrelor, atorvastatin, and losartan. Please follow up outpatient with your primary care provider Dr. Merissa Pearson as well as your Cardiologist Dr. Orlando Huddleston. If you begin to have worsening chest pain or shortness of breath, please return to the hospital.      SECONDARY DISCHARGE DIAGNOSES  Diagnosis: DM (diabetes mellitus)  Assessment and Plan of Treatment: You have a history of diabetes. We sent prescriptions to your pharmacy for your home medications of Glipizide and Janumet. Please take as directed and continue to follow up your diabetes care with your primary care doctor Dr. Merissa Pearson.

## 2020-08-23 NOTE — PROGRESS NOTE ADULT - SUBJECTIVE AND OBJECTIVE BOX
PROGRESS NOTE:     CONTACT INFO:  Ford Heart MD PGY-1  CCU [id number]    Patient is a 59y old  Male who presents with a chief complaint of NSTEMI (22 Aug 2020 11:40)      SUBJECTIVE / OVERNIGHT EVENTS:  No acute events overnight. Patient seen and evaluated at bedside. States he has generalized weakness, dizziness, and fatigue but states CP and SOB have improved after cath procedure. Otherwise, denies  fever, chills, nausea, vomiting, SOB at rest, chest pain, palpitations, abdominal pain, focal weakness or sensory changes.    Tele: normal sinus    PRN: required no significant PRN overnight.       ADDITIONAL REVIEW OF SYSTEMS:    MEDICATIONS  (STANDING):  aspirin enteric coated 81 milliGRAM(s) Oral daily  atorvastatin 80 milliGRAM(s) Oral at bedtime  calcium carbonate 1250 mG  + Vitamin D (OsCal 500 + D) 1 Tablet(s) Oral two times a day  chlorhexidine 4% Liquid 1 Application(s) Topical daily  dextrose 5%. 1000 milliLiter(s) (50 mL/Hr) IV Continuous <Continuous>  dextrose 50% Injectable 12.5 Gram(s) IV Push once  dextrose 50% Injectable 25 Gram(s) IV Push once  dextrose 50% Injectable 25 Gram(s) IV Push once  heparin   Injectable 5000 Unit(s) SubCutaneous every 8 hours  insulin lispro (HumaLOG) corrective regimen sliding scale   SubCutaneous three times a day before meals  insulin lispro (HumaLOG) corrective regimen sliding scale   SubCutaneous at bedtime  pantoprazole    Tablet 40 milliGRAM(s) Oral before breakfast  ticagrelor 90 milliGRAM(s) Oral every 12 hours    MEDICATIONS  (PRN):  dextrose 40% Gel 15 Gram(s) Oral once PRN Blood Glucose LESS THAN 70 milliGRAM(s)/deciliter  glucagon  Injectable 1 milliGRAM(s) IntraMuscular once PRN Glucose LESS THAN 70 milligrams/deciliter      CAPILLARY BLOOD GLUCOSE      POCT Blood Glucose.: 147 mg/dL (23 Aug 2020 07:29)  POCT Blood Glucose.: 147 mg/dL (22 Aug 2020 20:17)    I&O's Summary    22 Aug 2020 07:01  -  23 Aug 2020 07:00  --------------------------------------------------------  IN: 457.5 mL / OUT: 1625 mL / NET: -1167.5 mL        PHYSICAL EXAM:  Vital Signs Last 24 Hrs  T(C): 36.6 (23 Aug 2020 04:00), Max: 37 (22 Aug 2020 16:00)  T(F): 97.8 (23 Aug 2020 04:00), Max: 98.6 (22 Aug 2020 16:00)  HR: 72 (23 Aug 2020 06:00) (63 - 92)  BP: 92/64 (23 Aug 2020 06:00) (90/64 - 139/77)  BP(mean): 74 (23 Aug 2020 06:00) (68 - 99)  RR: 18 (23 Aug 2020 06:00) (13 - 29)  SpO2: 98% (23 Aug 2020 06:00) (96% - 100%)      	GENERAL: NAD, well-developed  	HEAD:  Atraumatic, Normocephalic  	EYES: EOMI, conjunctiva and sclera clear  	NECK: Supple, No JVD  	CHEST/LUNG: Clear to auscultation bilaterally; No rhonchis, rales, or wheezing  	HEART: Regular rate and rhythm; S1, S2; No murmurs, rubs, or gallops  	ABDOMEN: Soft, Nontender, Nondistended; Normoactive bowel sounds  	EXTREMITIES:  2+ Peripheral Pulses, No clubbing, cyanosis, or edema  	PSYCH: A&Ox3  	NEUROLOGY: non-focal  SKIN: No rashes or lesions      LABS:                        11.7   8.83  )-----------( 200      ( 23 Aug 2020 03:00 )             35.6     08-23    133<L>  |  99  |  10  ----------------------------<  183<H>  4.1   |  20<L>  |  0.82    Ca    9.3      23 Aug 2020 03:00  Phos  3.9     08-23  Mg     1.9     08-23    TPro  6.5  /  Alb  3.9  /  TBili  0.5  /  DBili  x   /  AST  73<H>  /  ALT  19  /  AlkPhos  63  08-23    PT/INR - ( 22 Aug 2020 06:36 )   PT: 11.8 sec;   INR: 1.01 ratio         PTT - ( 22 Aug 2020 06:36 )  PTT:35.4 sec  CARDIAC MARKERS ( 22 Aug 2020 06:36 )  1.390 ng/mL / x     / x     / x     / x        A1C with Estimated Average Glucose in AM (08.23.20 @ 03:00)    A1C with Estimated Average Glucose: 8.1: High Risk (prediabetic)    5.7 - 6.4 %  Diabetic, diagnostic           > 6.5 %  ADA diabetic treatment goal    < 7.0 %    HbA1C values may not accurately reflect mean blood glucose  in patients with Hb variants.  Suggest clinical correlation. %            RADIOLOGY & ADDITIONAL TESTS:    < from: Xray Chest 2 Views PA/Lat (08.22.20 @ 07:03) >    EXAM:  XR CHEST PA LAT 2V                            PROCEDURE DATE:  08/22/2020          INTERPRETATION:  Chest radiographs    CLINICAL INFORMATION: Short of breath.    TECHNIQUE:  Frontal and lateral views of the chest were obtained.    FINDINGS: 12/20/2013 available for review.    The lungs are clear.  No pleural abnormality is seen.    The heart and mediastinum appear intact.      IMPRESSION:  No evidence of active chest disease.      SUBHASH BUSH M.D., ATTENDING RADIOLOGIST  This document has been electronically signed. Aug 22 2020  9:56AM          < end of copied text > PROGRESS NOTE:     CONTACT INFO:  [facility] MD PGY-1  CCU [id number]    Patient is a 59y old  Male who presents with a chief complaint of NSTEMI (22 Aug 2020 11:40)      SUBJECTIVE / OVERNIGHT EVENTS:  No acute events overnight. Patient seen and evaluated at bedside. States he has generalized weakness, dizziness, and fatigue but states CP and SOB have improved after cath procedure. Otherwise, denies  fever, chills, nausea, vomiting, SOB at rest, chest pain, palpitations, abdominal pain, focal weakness or sensory changes.    Tele: normal sinus. ST depressions have improved in V4-V6    PRN: required no significant PRN overnight.       ADDITIONAL REVIEW OF SYSTEMS:    MEDICATIONS  (STANDING):  aspirin enteric coated 81 milliGRAM(s) Oral daily  atorvastatin 80 milliGRAM(s) Oral at bedtime  calcium carbonate 1250 mG  + Vitamin D (OsCal 500 + D) 1 Tablet(s) Oral two times a day  chlorhexidine 4% Liquid 1 Application(s) Topical daily  dextrose 5%. 1000 milliLiter(s) (50 mL/Hr) IV Continuous <Continuous>  dextrose 50% Injectable 12.5 Gram(s) IV Push once  dextrose 50% Injectable 25 Gram(s) IV Push once  dextrose 50% Injectable 25 Gram(s) IV Push once  heparin   Injectable 5000 Unit(s) SubCutaneous every 8 hours  insulin lispro (HumaLOG) corrective regimen sliding scale   SubCutaneous three times a day before meals  insulin lispro (HumaLOG) corrective regimen sliding scale   SubCutaneous at bedtime  pantoprazole    Tablet 40 milliGRAM(s) Oral before breakfast  ticagrelor 90 milliGRAM(s) Oral every 12 hours    MEDICATIONS  (PRN):  dextrose 40% Gel 15 Gram(s) Oral once PRN Blood Glucose LESS THAN 70 milliGRAM(s)/deciliter  glucagon  Injectable 1 milliGRAM(s) IntraMuscular once PRN Glucose LESS THAN 70 milligrams/deciliter      CAPILLARY BLOOD GLUCOSE      POCT Blood Glucose.: 147 mg/dL (23 Aug 2020 07:29)  POCT Blood Glucose.: 147 mg/dL (22 Aug 2020 20:17)    I&O's Summary    22 Aug 2020 07:01  -  23 Aug 2020 07:00  --------------------------------------------------------  IN: 457.5 mL / OUT: 1625 mL / NET: -1167.5 mL        PHYSICAL EXAM:  Vital Signs Last 24 Hrs  T(C): 36.6 (23 Aug 2020 04:00), Max: 37 (22 Aug 2020 16:00)  T(F): 97.8 (23 Aug 2020 04:00), Max: 98.6 (22 Aug 2020 16:00)  HR: 72 (23 Aug 2020 06:00) (63 - 92)  BP: 92/64 (23 Aug 2020 06:00) (90/64 - 139/77)  BP(mean): 74 (23 Aug 2020 06:00) (68 - 99)  RR: 18 (23 Aug 2020 06:00) (13 - 29)  SpO2: 98% (23 Aug 2020 06:00) (96% - 100%)      	GENERAL: NAD, well-developed  	HEAD:  Atraumatic, Normocephalic  	EYES: EOMI, conjunctiva and sclera clear  	NECK: Supple, No JVD  	CHEST/LUNG: Clear to auscultation bilaterally; No rhonchis, rales, or wheezing  	HEART: Regular rate and rhythm; S1, S2; No murmurs, rubs, or gallops  	ABDOMEN: Soft, Nontender, Nondistended; Normoactive bowel sounds  	EXTREMITIES:  2+ Peripheral Pulses, No clubbing, cyanosis, or edema  	PSYCH: A&Ox3  	NEUROLOGY: non-focal  SKIN: No rashes or lesions      LABS:                        11.7   8.83  )-----------( 200      ( 23 Aug 2020 03:00 )             35.6     08-23    133<L>  |  99  |  10  ----------------------------<  183<H>  4.1   |  20<L>  |  0.82    Ca    9.3      23 Aug 2020 03:00  Phos  3.9     08-23  Mg     1.9     08-23    TPro  6.5  /  Alb  3.9  /  TBili  0.5  /  DBili  x   /  AST  73<H>  /  ALT  19  /  AlkPhos  63  08-23    PT/INR - ( 22 Aug 2020 06:36 )   PT: 11.8 sec;   INR: 1.01 ratio         PTT - ( 22 Aug 2020 06:36 )  PTT:35.4 sec  CARDIAC MARKERS ( 22 Aug 2020 06:36 )  1.390 ng/mL / x     / x     / x     / x        A1C with Estimated Average Glucose in AM (08.23.20 @ 03:00)    A1C with Estimated Average Glucose: 8.1: High Risk (prediabetic)    5.7 - 6.4 %  Diabetic, diagnostic           > 6.5 %  ADA diabetic treatment goal    < 7.0 %    HbA1C values may not accurately reflect mean blood glucose  in patients with Hb variants.  Suggest clinical correlation. %      RADIOLOGY & ADDITIONAL TESTS:    < from: Xray Chest 2 Views PA/Lat (08.22.20 @ 07:03) >    EXAM:  XR CHEST PA LAT 2V                            PROCEDURE DATE:  08/22/2020          INTERPRETATION:  Chest radiographs    CLINICAL INFORMATION: Short of breath.    TECHNIQUE:  Frontal and lateral views of the chest were obtained.    FINDINGS: 12/20/2013 available for review.    The lungs are clear.  No pleural abnormality is seen.    The heart and mediastinum appear intact.      IMPRESSION:  No evidence of active chest disease.      SBUHASH BUSH M.D., ATTENDING RADIOLOGIST  This document has been electronically signed. Aug 22 2020  9:56AM          < end of copied text >

## 2020-08-23 NOTE — DISCHARGE NOTE PROVIDER - HOSPITAL COURSE
58yo M with PMH of HTN, HLD, DM, and CAD s/p 2 stents in 2005, who p/w 1d of CP and SOB presented to [facility], then xferred to [facility] for NSTEMI and for urgent cardiac catheterization, found to have 99% stenosis of D1 (first diagonal segment of LAD) had balloon angioplasty of D1 (on 8/22), pending another percutaneous coronary intervention of OM1 (first obtuse marginal) on Mon (8/23). Could not stent as vessels small. EKG at [facility] showed ST depressions in V4-6, which improved after 1st cardiac cath and after being loaded with heparin ggt, asp 325, brilinta. For NSTEMI patient received aspirin 81, brilinta 90BID, atorvastatin 80. Started lopressor 12.5 BID. Patient's A1c was 8.1 and was on ISS. TSH 2.96 with elevated TG on lipid panel.         8/22 LHC: Diag 1 99%- POBA to the diag 1, OM1 patent stent subtotal distal occlusion, RCA mid 60% ISR, Ramus is small with severe disease, OM2 prox/mid 80%. 58yo M with PMH of HTN, HLD, DM, and CAD s/p 2 stents in 2005, who p/w 1d of CP and SOB presented to [facility], then xferred to [facility] for NSTEMI and for urgent cardiac catheterization, found to have 99% stenosis of D1 (first diagonal segment of LAD) had balloon angioplasty of D1 (on 8/22) and percutaneous coronary intervention of OM1 (first obtuse marginal) on Mon (8/24). Could not stent as vessels small. EKG at [facility] showed ST depressions in V4-6, which improved after 1st cardiac cath and after being loaded with heparin ggt, asp 325, brilinta. For NSTEMI patient received aspirin 81, brilinta 90BID, atorvastatin 80. Started lopressor 12.5 BID. Patient's A1c was 8.1 and was on ISS. TSH 2.96 with elevated TG on lipid panel.         8/22 [facility]: Diag 1 99%- POBA to the diag 1, OM1 patent stent subtotal distal occlusion, RCA mid 60% ISR, Ramus is small with severe disease, OM2 prox/mid 80%.         8/24 PCI: POBA to OM1 90% w/ 40% residual

## 2020-08-23 NOTE — PROGRESS NOTE ADULT - ASSESSMENT
Patient is a 59 year old male AAOx3 from home, with PMH of HTN, HLD, DM, and CAD s/p 2 stents in 2005, who p/w chest pain to [facility], then xferred to [facility] for NSTEMI and for urgent cardiac catheterization, fth 99% stenosis of D1 now s/p balloon angioplasty of D1, pending staged PCI of OM1.

## 2020-08-23 NOTE — PROGRESS NOTE ADULT - PROBLEM SELECTOR PLAN 3
-HGBA1c 8.1  -FSqac and qhs  -Hold oral diabetic medications -HGBA1c 8.1  -FSqac and qhs  -Hold oral diabetic medications and c/w ISS

## 2020-08-23 NOTE — DISCHARGE NOTE PROVIDER - NSDCMRMEDTOKEN_GEN_ALL_CORE_FT
aspirin 81 mg oral delayed release tablet: 1 tab(s) orally once a day  GlipiZIDE XL 5 mg oral tablet, extended release: 1 tab(s) orally once a day  Hydrocort 2.5% topical cream: Apply topically to affected area 3 times a day  Janumet 50 mg-1000 mg oral tablet: 1 tab(s) orally 2 times a day  losartan 100 mg oral tablet: 1 tab(s) orally once a day  Metoprolol Tartrate 50 mg oral tablet: 1 tab(s) orally 2 times a day  omeprazole 20 mg oral delayed release capsule: 1 cap(s) orally once a day  Oysco 500 with D 500 mg-200 intl units (5 mcg) oral tablet: 1 tab(s) orally 2 times a day  Zocor 20 mg oral tablet: 1 tab(s) orally once a day (at bedtime) aspirin 81 mg oral delayed release tablet: 1 tab(s) orally once a day  atorvastatin 80 mg oral tablet: 1 tab(s) orally once a day (at bedtime)  GlipiZIDE XL 5 mg oral tablet, extended release: 1 tab(s) orally once a day  Hydrocort 2.5% topical cream: Apply topically to affected area 3 times a day  Janumet 50 mg-1000 mg oral tablet: 1 tab(s) orally 2 times a day  losartan 25 mg oral tablet: 1 tab(s) orally once a day  omeprazole 20 mg oral delayed release capsule: 1 cap(s) orally once a day  Oysco 500 with D 500 mg-200 intl units (5 mcg) oral tablet: 1 tab(s) orally 2 times a day  senna oral tablet: 2 tab(s) orally once a day (at bedtime)  ticagrelor 90 mg oral tablet: 1 tab(s) orally every 12 hours  Toprol-XL 25 mg oral tablet, extended release: 1 tab(s) orally once a day aspirin 81 mg oral delayed release tablet: 1 tab(s) orally once a day  atorvastatin 80 mg oral tablet: 1 tab(s) orally once a day (at bedtime)  GlipiZIDE XL 5 mg oral tablet, extended release: 1 tab(s) orally once a day  Hydrocort 2.5% topical cream: Apply topically to affected area 3 times a day  Janumet 50 mg-1000 mg oral tablet: 1 tab(s) orally 2 times a day  losartan 25 mg oral tablet: 1 tab(s) orally once a day  omeprazole 20 mg oral delayed release capsule: 1 cap(s) orally once a day  Oysco 500 with D 500 mg-200 intl units (5 mcg) oral tablet: 1 tab(s) orally 2 times a day  senna oral tablet: 2 tab(s) orally once a day (at bedtime)  senna oral tablet: 2 tab(s) orally once a day (at bedtime)  ticagrelor 90 mg oral tablet: 1 tab(s) orally every 12 hours  Toprol-XL 25 mg oral tablet, extended release: 1 tab(s) orally once a day

## 2020-08-23 NOTE — PROGRESS NOTE ADULT - PROBLEM SELECTOR PLAN 2
-Awaiting staged PCI of OM1  -Will c/w BB as tolerated  -Holding losartan 100 for now, will resume prior to discharge

## 2020-08-23 NOTE — DISCHARGE NOTE PROVIDER - PROVIDER TOKENS
PROVIDER:[TOKEN:[[id number]],ESTABLISHEDPATIENT:[T]],PROVIDER:[TOKEN:[[id number]],ESTABLISHEDPATIENT:[T]]

## 2020-08-23 NOTE — PROGRESS NOTE ADULT - PROBLEM SELECTOR PLAN 1
-Admit to CCU for continuous tele monitoring  - CMP with mag and phos, CBC, Coags, lipid profile, TSH, serial cardiac enzymes  - HgbA1c 8.1  - s/p cardiac cath 8/22. F/T/h  -s/p ASA and brilinta load, will resume asa 81 and brilinta 90 bid  continue lipitor 80  -Will resume BB and ARB post cardiac cath as tolerated Admitted to CCU for continuous tele monitoring  - trend CMP with mag and phos, CBC, Coags, serial cardiac enzymes  - HgbA1c 8.1, TSH 2.96, elevated TG in lipid profile  - s/p cardiac cath 8/22. F/T/h on 8/22 LHC: Diag 1 99%- POBA to the diag 1, OM1 patent stent subtotal distal occlusion, RCA mid 60% ISR, Ramus is small with severe disease, OM2 prox/mid 80%. Plan for staged PCI of OM1 lesion prior to discharge.  -s/p ASA and brilinta load, will resume asa 81 and brilinta 90 bid  continue lipitor 80  - will start lopressor 12.5 BID today  -Will resume ARB post staged PCI tomorrow as tolerated  - pending echo

## 2020-08-24 VITALS
RESPIRATION RATE: 18 BRPM | TEMPERATURE: 98 F | SYSTOLIC BLOOD PRESSURE: 143 MMHG | HEART RATE: 98 BPM | DIASTOLIC BLOOD PRESSURE: 73 MMHG | OXYGEN SATURATION: 100 %

## 2020-08-24 LAB
ANION GAP SERPL CALC-SCNC: 15 MMO/L — HIGH (ref 7–14)
APTT BLD: 33.9 SEC — SIGNIFICANT CHANGE UP (ref 27–36.3)
BUN SERPL-MCNC: 10 MG/DL — SIGNIFICANT CHANGE UP (ref 7–23)
CALCIUM SERPL-MCNC: 9.2 MG/DL — SIGNIFICANT CHANGE UP (ref 8.4–10.5)
CHLORIDE SERPL-SCNC: 100 MMOL/L — SIGNIFICANT CHANGE UP (ref 98–107)
CK MB BLD-MCNC: 8.81 NG/ML — HIGH (ref 1–6.6)
CK SERPL-CCNC: 199 U/L — SIGNIFICANT CHANGE UP (ref 30–200)
CO2 SERPL-SCNC: 19 MMOL/L — LOW (ref 22–31)
CREAT SERPL-MCNC: 0.9 MG/DL — SIGNIFICANT CHANGE UP (ref 0.5–1.3)
GLUCOSE BLDC GLUCOMTR-MCNC: 179 MG/DL — HIGH (ref 70–99)
GLUCOSE SERPL-MCNC: 171 MG/DL — HIGH (ref 70–99)
HCT VFR BLD CALC: 36.3 % — LOW (ref 39–50)
HGB BLD-MCNC: 12.3 G/DL — LOW (ref 13–17)
INR BLD: 1.09 — SIGNIFICANT CHANGE UP (ref 0.88–1.16)
MAGNESIUM SERPL-MCNC: 2.1 MG/DL — SIGNIFICANT CHANGE UP (ref 1.6–2.6)
MCHC RBC-ENTMCNC: 28.3 PG — SIGNIFICANT CHANGE UP (ref 27–34)
MCHC RBC-ENTMCNC: 33.9 % — SIGNIFICANT CHANGE UP (ref 32–36)
MCV RBC AUTO: 83.4 FL — SIGNIFICANT CHANGE UP (ref 80–100)
NRBC # FLD: 0 K/UL — SIGNIFICANT CHANGE UP (ref 0–0)
PHOSPHATE SERPL-MCNC: 4.4 MG/DL — SIGNIFICANT CHANGE UP (ref 2.5–4.5)
PLATELET # BLD AUTO: 181 K/UL — SIGNIFICANT CHANGE UP (ref 150–400)
PMV BLD: 12.8 FL — SIGNIFICANT CHANGE UP (ref 7–13)
POTASSIUM SERPL-MCNC: 4.2 MMOL/L — SIGNIFICANT CHANGE UP (ref 3.5–5.3)
POTASSIUM SERPL-SCNC: 4.2 MMOL/L — SIGNIFICANT CHANGE UP (ref 3.5–5.3)
PROTHROM AB SERPL-ACNC: 12.4 SEC — SIGNIFICANT CHANGE UP (ref 10.6–13.6)
RBC # BLD: 4.35 M/UL — SIGNIFICANT CHANGE UP (ref 4.2–5.8)
RBC # FLD: 13 % — SIGNIFICANT CHANGE UP (ref 10.3–14.5)
SODIUM SERPL-SCNC: 134 MMOL/L — LOW (ref 135–145)
TROPONIN T, HIGH SENSITIVITY: 1119 NG/L — CRITICAL HIGH (ref ?–14)
WBC # BLD: 7.35 K/UL — SIGNIFICANT CHANGE UP (ref 3.8–10.5)
WBC # FLD AUTO: 7.35 K/UL — SIGNIFICANT CHANGE UP (ref 3.8–10.5)

## 2020-08-24 PROCEDURE — 92928 PRQ TCAT PLMT NTRAC ST 1 LES: CPT | Mod: LC

## 2020-08-24 RX ORDER — LOSARTAN POTASSIUM 100 MG/1
1 TABLET, FILM COATED ORAL
Qty: 30 | Refills: 0
Start: 2020-08-24 | End: 2020-09-22

## 2020-08-24 RX ORDER — ATORVASTATIN CALCIUM 80 MG/1
1 TABLET, FILM COATED ORAL
Qty: 30 | Refills: 0
Start: 2020-08-24 | End: 2020-09-22

## 2020-08-24 RX ORDER — TICAGRELOR 90 MG/1
1 TABLET ORAL
Qty: 60 | Refills: 0
Start: 2020-08-24 | End: 2020-09-22

## 2020-08-24 RX ORDER — METOPROLOL TARTRATE 50 MG
1 TABLET ORAL
Qty: 30 | Refills: 0
Start: 2020-08-24 | End: 2020-09-22

## 2020-08-24 RX ORDER — ASPIRIN/CALCIUM CARB/MAGNESIUM 324 MG
1 TABLET ORAL
Qty: 30 | Refills: 0
Start: 2020-08-24 | End: 2020-09-22

## 2020-08-24 RX ORDER — LOSARTAN POTASSIUM 100 MG/1
1 TABLET, FILM COATED ORAL
Qty: 0 | Refills: 0 | DISCHARGE

## 2020-08-24 RX ORDER — SITAGLIPTIN AND METFORMIN HYDROCHLORIDE 500; 50 MG/1; MG/1
1 TABLET, FILM COATED ORAL
Qty: 60 | Refills: 0
Start: 2020-08-24 | End: 2020-09-22

## 2020-08-24 RX ORDER — SENNA PLUS 8.6 MG/1
2 TABLET ORAL
Qty: 60 | Refills: 0
Start: 2020-08-24 | End: 2020-09-22

## 2020-08-24 RX ORDER — ASPIRIN/CALCIUM CARB/MAGNESIUM 324 MG
1 TABLET ORAL
Qty: 0 | Refills: 0 | DISCHARGE

## 2020-08-24 RX ORDER — SITAGLIPTIN AND METFORMIN HYDROCHLORIDE 500; 50 MG/1; MG/1
1 TABLET, FILM COATED ORAL
Qty: 0 | Refills: 0 | DISCHARGE

## 2020-08-24 RX ORDER — SENNA PLUS 8.6 MG/1
2 TABLET ORAL
Qty: 0 | Refills: 0 | DISCHARGE
Start: 2020-08-24

## 2020-08-24 RX ORDER — SIMVASTATIN 20 MG/1
1 TABLET, FILM COATED ORAL
Qty: 0 | Refills: 0 | DISCHARGE

## 2020-08-24 RX ORDER — METOPROLOL TARTRATE 50 MG
1 TABLET ORAL
Qty: 0 | Refills: 0 | DISCHARGE

## 2020-08-24 RX ADMIN — Medication 12.5 MILLIGRAM(S): at 06:15

## 2020-08-24 RX ADMIN — Medication 81 MILLIGRAM(S): at 10:28

## 2020-08-24 RX ADMIN — Medication 1 TABLET(S): at 06:15

## 2020-08-24 RX ADMIN — TICAGRELOR 90 MILLIGRAM(S): 90 TABLET ORAL at 06:15

## 2020-08-24 RX ADMIN — HEPARIN SODIUM 5000 UNIT(S): 5000 INJECTION INTRAVENOUS; SUBCUTANEOUS at 06:15

## 2020-08-24 NOTE — CHART NOTE - NSCHARTNOTEFT_GEN_A_CORE
Multiple attempts made to see patient for critical care nutrition assessment however, patient out of room. Will follow-up as able to complete initial evaluation.     RD remains available, consult as needed. Pamela Zamora RD, CDN Pager #[phone]

## 2020-08-24 NOTE — DISCHARGE NOTE NURSING/CASE MANAGEMENT/SOCIAL WORK - PATIENT PORTAL LINK FT
You can access the FollowMyHealth Patient Portal offered by [facility] by registering at the following website: [email or web address]. By joining [facility]’s FollowMyHealth portal, you will also be able to view your health information using other applications (apps) compatible with our system.

## 2020-08-24 NOTE — PROGRESS NOTE ADULT - ASSESSMENT
Patient is a 59 year old male AAOx3 from home, with PMH of HTN, HLD, DM, and CAD s/p 2 stents in 2005, who p/w chest pain to [facility], then xferred to [facility] for NSTEMI and for urgent cardiac catheterization, fth 99% stenosis of D1 now s/p balloon angioplasty of D1, pending staged PCI of OM1. Patient is a 59 year old male AAOx3 from home, with PMH of HTN, HLD, DM, and CAD s/p 2 stents in 2005, who p/w chest pain to [facility], then xferred to [facility] for NSTEMI and for urgent cardiac catheterization, fth 99% stenosis of D1 now s/p balloon angioplasty of D1, pending staged PCI of OM1 today, 8/24, w/ likely d/c afterward pending no complications.

## 2020-08-24 NOTE — CHART NOTE - NSCHARTNOTEFT_GEN_A_CORE
Right radial band removed at 14:30, No hematoma or active bleeding, + pulse.  Mild tenderness at the site,  DSD applied.  Monitor for 2 hours then discharge home. Nutritional consult

## 2020-08-24 NOTE — PROGRESS NOTE ADULT - PROBLEM SELECTOR PLAN 4
-Holding ARB and will resume as tolerated.  - BP soft for now, will monitor and address accordingly -Holding ARB and will resume at d/c  -BP soft for now, will monitor and address accordingly

## 2020-08-24 NOTE — PROGRESS NOTE ADULT - SUBJECTIVE AND OBJECTIVE BOX
*****NOTE IN PROGRESS/ NEED TO UPDATE***** Authored by Jessie Dimas MD, PGY1, [facility] Pager: [phone]    PATIENT:  UZAIR SMITH  [id number]    CHIEF COMPLAINT:  Patient is a 59y old  Male who presents with a chief complaint of NSTEMI (24 Aug 2020 07:49)      INTERVAL HISTORY/ OVERNIGHT EVENTS:  NSR on Tel    REVIEW OF SYSTEMS:    Constitutional:     [ ] negative [ ] fevers [ ] chills [ ] weight loss [ ] weight gain  HEENT:                  [ ] negative [ ] dry eyes [ ] eye irritation [ ] postnasal drip [ ] nasal congestion  CV:                         [ ] negative  [ ] chest pain [ ] orthopnea [ ] palpitations [ ] murmur  Resp:                     [ ] negative [ ] cough [ ] shortness of breath [ ] dyspnea [ ] wheezing [ ] sputum [ ] hemoptysis  GI:                          [ ] negative [ ] nausea [ ] vomiting [ ] diarrhea [ ] constipation [ ] abd pain [ ] dysphagia   :                        [ ] negative [ ] dysuria [ ] nocturia [ ] hematuria [ ] increased urinary frequency  Musculoskeletal: [ ] negative [ ] back pain [ ] myalgias [ ] arthralgias [ ] fracture  Skin:                       [ ] negative [ ] rash [ ] itch  Neurological:        [ ] negative [ ] headache [ ] dizziness [ ] syncope [ ] weakness [ ] numbness  Psychiatric:           [ ] negative [ ] anxiety [ ] depression  Endocrine:            [ ] negative [ ] diabetes [ ] thyroid problem  Heme/Lymph:      [ ] negative [ ] anemia [ ] bleeding problem  Allergic/Immune: [ ] negative [ ] itchy eyes [ ] nasal discharge [ ] hives [ ] angioedema    [ ] All other systems negative  [ ] Unable to assess ROS because ________.    MEDICATIONS:  MEDICATIONS  (STANDING):  aspirin enteric coated 81 milliGRAM(s) Oral daily  atorvastatin 80 milliGRAM(s) Oral at bedtime  calcium carbonate 1250 mG  + Vitamin D (OsCal 500 + D) 1 Tablet(s) Oral two times a day  chlorhexidine 4% Liquid 1 Application(s) Topical daily  dextrose 5%. 1000 milliLiter(s) (50 mL/Hr) IV Continuous <Continuous>  dextrose 50% Injectable 12.5 Gram(s) IV Push once  dextrose 50% Injectable 25 Gram(s) IV Push once  dextrose 50% Injectable 25 Gram(s) IV Push once  heparin   Injectable 5000 Unit(s) SubCutaneous every 8 hours  insulin lispro (HumaLOG) corrective regimen sliding scale   SubCutaneous three times a day before meals  insulin lispro (HumaLOG) corrective regimen sliding scale   SubCutaneous at bedtime  metoprolol tartrate 12.5 milliGRAM(s) Oral two times a day  pantoprazole    Tablet 40 milliGRAM(s) Oral before breakfast  senna 2 Tablet(s) Oral at bedtime  ticagrelor 90 milliGRAM(s) Oral every 12 hours    MEDICATIONS  (PRN):  dextrose 40% Gel 15 Gram(s) Oral once PRN Blood Glucose LESS THAN 70 milliGRAM(s)/deciliter  glucagon  Injectable 1 milliGRAM(s) IntraMuscular once PRN Glucose LESS THAN 70 milligrams/deciliter      ALLERGIES:  Allergies    No Known Allergies    Intolerances        OBJECTIVE:  ICU Vital Signs Last 24 Hrs  T(C): 37.1 (24 Aug 2020 08:44), Max: 37.1 (24 Aug 2020 08:44)  T(F): 98.7 (24 Aug 2020 08:44), Max: 98.7 (24 Aug 2020 08:44)  HR: 69 (24 Aug 2020 10:00) (66 - 99)  BP: 127/78 (24 Aug 2020 10:00) (96/64 - 134/78)  BP(mean): 94 (24 Aug 2020 10:00) (69 - 100)  ABP: --  ABP(mean): --  RR: 13 (24 Aug 2020 10:00) (13 - 34)  SpO2: 100% (24 Aug 2020 10:00) (98% - 100%)      Adult Advanced Hemodynamics Last 24 Hrs  CVP(mm Hg): --  CVP(cm H2O): --  CO: --  CI: --  PA: --  PA(mean): --  PCWP: --  SVR: --  SVRI: --  PVR: --  PVRI: --  CAPILLARY BLOOD GLUCOSE      POCT Blood Glucose.: 179 mg/dL (24 Aug 2020 07:52)  POCT Blood Glucose.: 196 mg/dL (23 Aug 2020 21:12)  POCT Blood Glucose.: 169 mg/dL (23 Aug 2020 16:29)  POCT Blood Glucose.: 228 mg/dL (23 Aug 2020 11:33)    CAPILLARY BLOOD GLUCOSE      POCT Blood Glucose.: 179 mg/dL (24 Aug 2020 07:52)    I&O's Summary    23 Aug 2020 07:01  -  24 Aug 2020 07:00  --------------------------------------------------------  IN: 950 mL / OUT: 2440 mL / NET: -1490 mL    24 Aug 2020 07:01  -  24 Aug 2020 10:34  --------------------------------------------------------  IN: 120 mL / OUT: 0 mL / NET: 120 mL      Daily     Daily Weight in k.5 (24 Aug 2020 04:00)    PHYSICAL EXAMINATION:  General: WN/WD NAD  HEENT: PERRLA, EOMI, moist mucous membranes  Neurology: A&Ox3, nonfocal, CHAVEZ x 4  Respiratory: CTA B/L, normal respiratory effort, no wheezes, crackles, rales  CV: RRR, S1S2, no murmurs, rubs or gallops  Abdominal: Soft, NT, ND +BS, Last BM  Extremities: No edema, + peripheral pulses  Incisions:   Tubes:    LABS:                          12.3   7.35  )-----------( 181      ( 24 Aug 2020 04:30 )             36.3     08-24    134<L>  |  100  |  10  ----------------------------<  171<H>  4.2   |  19<L>  |  0.90    Ca    9.2      24 Aug 2020 04:30  Phos  4.4     08-24  Mg     2.1     08-24    TPro  6.5  /  Alb  3.9  /  TBili  0.5  /  DBili  x   /  AST  73<H>  /  ALT  19  /  AlkPhos  63  08-23    LIVER FUNCTIONS - ( 23 Aug 2020 03:00 )  Alb: 3.9 g/dL / Pro: 6.5 g/dL / ALK PHOS: 63 u/L / ALT: 19 u/L / AST: 73 u/L / GGT: x           PT/INR - ( 24 Aug 2020 04:30 )   PT: 12.4 SEC;   INR: 1.09          PTT - ( 24 Aug 2020 04:30 )  PTT:33.9 SEC  Creatine Kinase, Serum: 199 u/L ( @ 04:30)  CKMB: 8.81 ng/mL ( @ 04:30)  Creatine Kinase, Serum: 451 u/L ( @ 12:30)  CKMB: 26.10 ng/mL ( @ 12:30)  CKMB Relative Index: 5.8 ( @ :30)    CARDIAC MARKERS ( 24 Aug 2020 04:30 )  x     / x     / 199 u/L / 8.81 ng/mL / x      CARDIAC MARKERS ( 23 Aug 2020 12:30 )  x     / x     / 451 u/L / 26.10 ng/mL / x      CARDIAC MARKERS ( 23 Aug 2020 03:00 )  x     / x     / 715 u/L / 49.47 ng/mL / x              TELEMETRY:     EKG:     IMAGING: Authored by Jessie Dimas MD, PGY1, [facility] Pager: [phone]    PATIENT:  UZAIR SMITH  [id number]    CHIEF COMPLAINT:  Patient is a 59y old  Male who presents with a chief complaint of NSTEMI (24 Aug 2020 07:49)    INTERVAL HISTORY/ OVERNIGHT EVENTS:  ECHO yesterday showed nl LVF and EF 74%. NSR on Tel w/ HR in the 80s O/N. Pt is anxious for PCI today, asking to go home afterward. Explained in depth the importance of post-procedure monitoring and need for safe discharge, and pt endorsed full understanding. Pt has no current complaints. Denies CP, palpitations, SOB.     REVIEW OF SYSTEMS:  Constitutional:     [ ] negative [ ] fevers [ ] chills [ ] weight loss [ ] weight gain  HEENT:                  [ ] negative [ ] dry eyes [ ] eye irritation [ ] postnasal drip [ ] nasal congestion  CV:                         [ ] negative  [ ] chest pain [ ] orthopnea [ ] palpitations [ ] murmur  Resp:                     [ ] negative [ ] cough [ ] shortness of breath [ ] dyspnea [ ] wheezing [ ] sputum [ ] hemoptysis  GI:                          [ ] negative [ ] nausea [ ] vomiting [ ] diarrhea [ ] constipation [ ] abd pain [ ] dysphagia   :                        [ ] negative [ ] dysuria [ ] nocturia [ ] hematuria [ ] increased urinary frequency  Musculoskeletal: [ ] negative [ ] back pain [ ] myalgias [ ] arthralgias [ ] fracture  Skin:                       [ ] negative [ ] rash [ ] itch  Neurological:        [ ] negative [ ] headache [ ] dizziness [ ] syncope [ ] weakness [ ] numbness  Psychiatric:           [ ] negative [ ] anxiety [ ] depression  Endocrine:            [ ] negative [ ] diabetes [ ] thyroid problem  Heme/Lymph:      [ ] negative [ ] anemia [ ] bleeding problem  Allergic/Immune: [ ] negative [ ] itchy eyes [ ] nasal discharge [ ] hives [ ] angioedema    [X ] All other systems negative  [ ] Unable to assess ROS because ________.    MEDICATIONS:  MEDICATIONS  (STANDING):  aspirin enteric coated 81 milliGRAM(s) Oral daily  atorvastatin 80 milliGRAM(s) Oral at bedtime  calcium carbonate 1250 mG  + Vitamin D (OsCal 500 + D) 1 Tablet(s) Oral two times a day  chlorhexidine 4% Liquid 1 Application(s) Topical daily  dextrose 5%. 1000 milliLiter(s) (50 mL/Hr) IV Continuous <Continuous>  dextrose 50% Injectable 12.5 Gram(s) IV Push once  dextrose 50% Injectable 25 Gram(s) IV Push once  dextrose 50% Injectable 25 Gram(s) IV Push once  heparin   Injectable 5000 Unit(s) SubCutaneous every 8 hours  insulin lispro (HumaLOG) corrective regimen sliding scale   SubCutaneous three times a day before meals  insulin lispro (HumaLOG) corrective regimen sliding scale   SubCutaneous at bedtime  metoprolol tartrate 12.5 milliGRAM(s) Oral two times a day  pantoprazole    Tablet 40 milliGRAM(s) Oral before breakfast  senna 2 Tablet(s) Oral at bedtime  ticagrelor 90 milliGRAM(s) Oral every 12 hours    MEDICATIONS  (PRN):  dextrose 40% Gel 15 Gram(s) Oral once PRN Blood Glucose LESS THAN 70 milliGRAM(s)/deciliter  glucagon  Injectable 1 milliGRAM(s) IntraMuscular once PRN Glucose LESS THAN 70 milligrams/deciliter      ALLERGIES:  No Known Allergies      OBJECTIVE:  ICU Vital Signs Last 24 Hrs  T(C): 37.1 (24 Aug 2020 08:44), Max: 37.1 (24 Aug 2020 08:44)  T(F): 98.7 (24 Aug 2020 08:44), Max: 98.7 (24 Aug 2020 08:44)  HR: 69 (24 Aug 2020 10:00) (66 - 99)  BP: 127/78 (24 Aug 2020 10:00) (96/64 - 134/78)  BP(mean): 94 (24 Aug 2020 10:00) (69 - 100)  ABP: --  ABP(mean): --  RR: 13 (24 Aug 2020 10:00) (13 - 34)  SpO2: 100% (24 Aug 2020 10:00) (98% - 100%)      Adult Advanced Hemodynamics Last 24 Hrs  CVP(mm Hg): --  CVP(cm H2O): --  CO: --  CI: --  PA: --  PA(mean): --  PCWP: --  SVR: --  SVRI: --  PVR: --  PVRI: --      CAPILLARY BLOOD GLUCOSE  POCT Blood Glucose.: 179 mg/dL (24 Aug 2020 07:52)  POCT Blood Glucose.: 196 mg/dL (23 Aug 2020 21:12)  POCT Blood Glucose.: 169 mg/dL (23 Aug 2020 16:29)  POCT Blood Glucose.: 228 mg/dL (23 Aug 2020 11:33)      I&O's Summary  23 Aug 2020 07:01  -  24 Aug 2020 07:00  --------------------------------------------------------  IN: 950 mL / OUT: 2440 mL / NET: -1490 mL    24 Aug 2020 07:01  -  24 Aug 2020 10:34  --------------------------------------------------------  IN: 120 mL / OUT: 0 mL / NET: 120 mL      Daily     Daily Weight in k.5 (24 Aug 2020 04:00)    PHYSICAL EXAMINATION:  General: WN/WD NAD  HEENT: PERRLA, EOMI, moist mucous membranes  Neurology: A&Ox3, nonfocal, CHAVEZ x 4  Respiratory: CTA B/L, normal respiratory effort, no wheezes, crackles, rales  CV: RRR, S1S2, no murmurs, rubs or gallops  Abdominal: Soft, NT, ND +BS  Extremities: No edema, + peripheral pulses      LABS:                   12.3   7.35  )-----------( 181      ( 24 Aug 2020 04:30 )             36.3     08-24    134<L>  |  100  |  10  ----------------------------<  171<H>  4.2   |  19<L>  |  0.90    Ca    9.2      24 Aug 2020 04:30  Phos  4.4     08-24  Mg     2.1     08-24    TPro  6.5  /  Alb  3.9  /  TBili  0.5  /  DBili  x   /  AST  73<H>  /  ALT  19  /  AlkPhos  63  08-23    LIVER FUNCTIONS - ( 23 Aug 2020 03:00 )  Alb: 3.9 g/dL / Pro: 6.5 g/dL / ALK PHOS: 63 u/L / ALT: 19 u/L / AST: 73 u/L / GGT: x           PT/INR - ( 24 Aug 2020 04:30 )   PT: 12.4 SEC;   INR: 1.09          PTT - ( 24 Aug 2020 04:30 )  PTT:33.9 SEC  Creatine Kinase, Serum: 199 u/L ( @ 04:30)  CKMB: 8.81 ng/mL ( @ 04:30)  Creatine Kinase, Serum: 451 u/L ( @ 12:30)  CKMB: 26.10 ng/mL ( @ 12:30)  CKMB Relative Index: 5.8 ( @ 12:30)    CARDIAC MARKERS ( 24 Aug 2020 04:30 )  x     / x     / 199 u/L / 8.81 ng/mL / x      CARDIAC MARKERS ( 23 Aug 2020 12:30 )  x     / x     / 451 u/L / 26.10 ng/mL / x      CARDIAC MARKERS ( 23 Aug 2020 03:00 )  x     / x     / 715 u/L / 49.47 ng/mL / x            TELEMETRY:   NSR w/ HR in the 80s      IMAGING: Authored by Jessie Dimas MD, PGY1, [facility] Pager: [phone]    PATIENT:  UZAIR SMITH  [id number]    CHIEF COMPLAINT:  Patient is a 59y old  Male who presents with a chief complaint of NSTEMI (24 Aug 2020 07:49)    INTERVAL HISTORY/ OVERNIGHT EVENTS:  ECHO yesterday showed nl LVF and EF 74%. NSR on Tel w/ HR in the 80s O/N. Pt is anxious for PCI today, asking to go home afterward. Explained in depth the importance of post-procedure monitoring and need for safe discharge, and pt endorsed full understanding. Pt has no current complaints. Denies CP, palpitations, SOB.     REVIEW OF SYSTEMS:  Constitutional:     [ ] negative [ ] fevers [ ] chills [ ] weight loss [ ] weight gain  HEENT:                  [ ] negative [ ] dry eyes [ ] eye irritation [ ] postnasal drip [ ] nasal congestion  CV:                         [ ] negative  [ ] chest pain [ ] orthopnea [ ] palpitations [ ] murmur  Resp:                     [ ] negative [ ] cough [ ] shortness of breath [ ] dyspnea [ ] wheezing [ ] sputum [ ] hemoptysis  GI:                          [ ] negative [ ] nausea [ ] vomiting [ ] diarrhea [ ] constipation [ ] abd pain [ ] dysphagia   :                        [ ] negative [ ] dysuria [ ] nocturia [ ] hematuria [ ] increased urinary frequency  Musculoskeletal: [ ] negative [ ] back pain [ ] myalgias [ ] arthralgias [ ] fracture  Skin:                       [ ] negative [ ] rash [ ] itch  Neurological:        [ ] negative [ ] headache [ ] dizziness [ ] syncope [ ] weakness [ ] numbness  Psychiatric:           [ ] negative [ ] anxiety [ ] depression  Endocrine:            [ ] negative [ ] diabetes [ ] thyroid problem  Heme/Lymph:      [ ] negative [ ] anemia [ ] bleeding problem  Allergic/Immune: [ ] negative [ ] itchy eyes [ ] nasal discharge [ ] hives [ ] angioedema    [X ] All other systems negative  [ ] Unable to assess ROS because ________.    MEDICATIONS:  MEDICATIONS  (STANDING):  aspirin enteric coated 81 milliGRAM(s) Oral daily  atorvastatin 80 milliGRAM(s) Oral at bedtime  calcium carbonate 1250 mG  + Vitamin D (OsCal 500 + D) 1 Tablet(s) Oral two times a day  chlorhexidine 4% Liquid 1 Application(s) Topical daily  dextrose 5%. 1000 milliLiter(s) (50 mL/Hr) IV Continuous <Continuous>  dextrose 50% Injectable 12.5 Gram(s) IV Push once  dextrose 50% Injectable 25 Gram(s) IV Push once  dextrose 50% Injectable 25 Gram(s) IV Push once  heparin   Injectable 5000 Unit(s) SubCutaneous every 8 hours  insulin lispro (HumaLOG) corrective regimen sliding scale   SubCutaneous three times a day before meals  insulin lispro (HumaLOG) corrective regimen sliding scale   SubCutaneous at bedtime  metoprolol tartrate 12.5 milliGRAM(s) Oral two times a day  pantoprazole    Tablet 40 milliGRAM(s) Oral before breakfast  senna 2 Tablet(s) Oral at bedtime  ticagrelor 90 milliGRAM(s) Oral every 12 hours    MEDICATIONS  (PRN):  dextrose 40% Gel 15 Gram(s) Oral once PRN Blood Glucose LESS THAN 70 milliGRAM(s)/deciliter  glucagon  Injectable 1 milliGRAM(s) IntraMuscular once PRN Glucose LESS THAN 70 milligrams/deciliter      ALLERGIES:  No Known Allergies      OBJECTIVE:  ICU Vital Signs Last 24 Hrs  T(C): 37.1 (24 Aug 2020 08:44), Max: 37.1 (24 Aug 2020 08:44)  T(F): 98.7 (24 Aug 2020 08:44), Max: 98.7 (24 Aug 2020 08:44)  HR: 69 (24 Aug 2020 10:00) (66 - 99)  BP: 127/78 (24 Aug 2020 10:00) (96/64 - 134/78)  BP(mean): 94 (24 Aug 2020 10:00) (69 - 100)  ABP: --  ABP(mean): --  RR: 13 (24 Aug 2020 10:00) (13 - 34)  SpO2: 100% (24 Aug 2020 10:00) (98% - 100%)      Adult Advanced Hemodynamics Last 24 Hrs  CVP(mm Hg): --  CVP(cm H2O): --  CO: --  CI: --  PA: --  PA(mean): --  PCWP: --  SVR: --  SVRI: --  PVR: --  PVRI: --      CAPILLARY BLOOD GLUCOSE  POCT Blood Glucose.: 179 mg/dL (24 Aug 2020 07:52)  POCT Blood Glucose.: 196 mg/dL (23 Aug 2020 21:12)  POCT Blood Glucose.: 169 mg/dL (23 Aug 2020 16:29)  POCT Blood Glucose.: 228 mg/dL (23 Aug 2020 11:33)      I&O's Summary  23 Aug 2020 07:01  -  24 Aug 2020 07:00  --------------------------------------------------------  IN: 950 mL / OUT: 2440 mL / NET: -1490 mL    24 Aug 2020 07:01  -  24 Aug 2020 10:34  --------------------------------------------------------  IN: 120 mL / OUT: 0 mL / NET: 120 mL      Daily     Daily Weight in k.5 (24 Aug 2020 04:00)    PHYSICAL EXAMINATION:  General: WN/WD NAD  HEENT: PERRLA, EOMI, moist mucous membranes  Neurology: A&Ox3, nonfocal, CHAVEZ x 4  Respiratory: CTA B/L, normal respiratory effort, no wheezes, crackles, rales  CV: RRR, S1S2, no murmurs, rubs or gallops  Abdominal: Soft, NT, ND +BS  Extremities: No edema, + peripheral pulses      LABS:                   12.3   7.35  )-----------( 181      ( 24 Aug 2020 04:30 )             36.3     08-24    134<L>  |  100  |  10  ----------------------------<  171<H>  4.2   |  19<L>  |  0.90    Ca    9.2      24 Aug 2020 04:30  Phos  4.4     08-24  Mg     2.1     08-24    TPro  6.5  /  Alb  3.9  /  TBili  0.5  /  DBili  x   /  AST  73<H>  /  ALT  19  /  AlkPhos  63  08-23    LIVER FUNCTIONS - ( 23 Aug 2020 03:00 )  Alb: 3.9 g/dL / Pro: 6.5 g/dL / ALK PHOS: 63 u/L / ALT: 19 u/L / AST: 73 u/L / GGT: x           PT/INR - ( 24 Aug 2020 04:30 )   PT: 12.4 SEC;   INR: 1.09          PTT - ( 24 Aug 2020 04:30 )  PTT:33.9 SEC  Creatine Kinase, Serum: 199 u/L ( @ 04:30)  CKMB: 8.81 ng/mL ( 04:30)  Creatine Kinase, Serum: 451 u/L ( @ 12:30)  CKMB: 26.10 ng/mL ( @ 12:30)  CKMB Relative Index: 5.8 ( @ 12:30)    CARDIAC MARKERS ( 24 Aug 2020 04:30 )  x     / x     / 199 u/L / 8.81 ng/mL / x      CARDIAC MARKERS ( 23 Aug 2020 12:30 )  x     / x     / 451 u/L / 26.10 ng/mL / x      CARDIAC MARKERS ( 23 Aug 2020 03:00 )  x     / x     / 715 u/L / 49.47 ng/mL / x            TELEMETRY:   NSR w/ HR in the 80s      IMAGIN) TTE  Patient name: UZAIR SMITH  YOB: 1960   Age: 59 (M)   MR#: [id number]  Study Date: 2020  [facility]  Study quality: Technically good  Referring Physician: Ras Ervin MD  Blood Pressure: 95/74 mmHg  Height: 167 cm  Weight: 63 kg  BSA: 1.7 m2  ------------------------------------------------------------------------  PROCEDURE: Transthoracic echocardiogram with 2-D, M-Mode  and complete spectral and color flow Doppler.  INDICATION: Abnormal electrocardiogram (ECG) (EKG) (R94.31)  ------------------------------------------------------------------------  DIMENSIONS:  Dimensions:     Normal Values:  LA:     2.9 cm    2.0 - 4.0 cm  Ao:     3.4 cm    2.0 - 3.8 cm  SEPTUM: 0.6 cm    0.6 - 1.2 cm  PWT:    0.6 cm    0.6 - 1.1 cm  LVIDd:  3.1 cm    3.0 - 5.6 cm  LVIDs:  1.8 cm    1.8 - 4.0 cm  Derived Variables:  LVMI: 25 g/m2  RWT: 0.38  Fractional short: 42 %  Ejection Fraction (Teicholtz): 74 %  ------------------------------------------------------------------------  OBSERVATIONS:  Mitral Valve: Normal mitral valve.  Aortic Root: Normal aortic root.  Aortic Valve: Normal trileaflet aortic valve.  Left Atrium: Normal left atrium.  Left Ventricle: Normal left ventricular systolic function.  No segmental wall motion abnormalities. Normal left  ventricular internal dimensions and wall thicknesses.  (DT:229 ms).  Right Heart: Normal right atrium. Normal right ventricular  size and function. Normal tricuspid valve. Normal pulmonic  valve.  Pericardium/PleuraNormal pericardium with no pericardial  effusion.  ------------------------------------------------------------------------  CONCLUSIONS:  1. Normal mitral valve.  2. Normal trileaflet aortic valve.  3. Normal left ventricular internal dimensions and wall  thicknesses.  4. Normal left ventricular systolic function. No segmental  wall motion abnormalities.  5. Normal right ventricular size and function.  ------------------------------------------------------------------------  Confirmed on  2020 - 15:59:19 by Ihsan De La Cruz M.D. [facility]  ------------------------------------------------------------------------    2) Authored by Jessie Dimas MD, PGY1, [facility] Pager: [phone]    PATIENT:  UZAIR SMITH  [id number]    CHIEF COMPLAINT:  Patient is a 59y old  Male who presents with a chief complaint of NSTEMI (24 Aug 2020 07:49)    INTERVAL HISTORY/ OVERNIGHT EVENTS:  ECHO yesterday showed nl LVF and EF 74%. NSR on Tel w/ HR in the 80s O/N. Pt is anxious for PCI today, asking to go home afterward. Explained in depth the importance of post-procedure monitoring and need for safe discharge, and pt endorsed full understanding. Pt has no current complaints. Denies CP, palpitations, SOB.     REVIEW OF SYSTEMS:  Constitutional:     [ ] negative [ ] fevers [ ] chills [ ] weight loss [ ] weight gain  HEENT:                  [ ] negative [ ] dry eyes [ ] eye irritation [ ] postnasal drip [ ] nasal congestion  CV:                         [ ] negative  [ ] chest pain [ ] orthopnea [ ] palpitations [ ] murmur  Resp:                     [ ] negative [ ] cough [ ] shortness of breath [ ] dyspnea [ ] wheezing [ ] sputum [ ] hemoptysis  GI:                          [ ] negative [ ] nausea [ ] vomiting [ ] diarrhea [ ] constipation [ ] abd pain [ ] dysphagia   :                        [ ] negative [ ] dysuria [ ] nocturia [ ] hematuria [ ] increased urinary frequency  Musculoskeletal: [ ] negative [ ] back pain [ ] myalgias [ ] arthralgias [ ] fracture  Skin:                       [ ] negative [ ] rash [ ] itch  Neurological:        [ ] negative [ ] headache [ ] dizziness [ ] syncope [ ] weakness [ ] numbness  Psychiatric:           [ ] negative [ ] anxiety [ ] depression  Endocrine:            [ ] negative [ ] diabetes [ ] thyroid problem  Heme/Lymph:      [ ] negative [ ] anemia [ ] bleeding problem  Allergic/Immune: [ ] negative [ ] itchy eyes [ ] nasal discharge [ ] hives [ ] angioedema    [X ] All other systems negative  [ ] Unable to assess ROS because ________.    MEDICATIONS:  MEDICATIONS  (STANDING):  aspirin enteric coated 81 milliGRAM(s) Oral daily  atorvastatin 80 milliGRAM(s) Oral at bedtime  calcium carbonate 1250 mG  + Vitamin D (OsCal 500 + D) 1 Tablet(s) Oral two times a day  chlorhexidine 4% Liquid 1 Application(s) Topical daily  dextrose 5%. 1000 milliLiter(s) (50 mL/Hr) IV Continuous <Continuous>  dextrose 50% Injectable 12.5 Gram(s) IV Push once  dextrose 50% Injectable 25 Gram(s) IV Push once  dextrose 50% Injectable 25 Gram(s) IV Push once  heparin   Injectable 5000 Unit(s) SubCutaneous every 8 hours  insulin lispro (HumaLOG) corrective regimen sliding scale   SubCutaneous three times a day before meals  insulin lispro (HumaLOG) corrective regimen sliding scale   SubCutaneous at bedtime  metoprolol tartrate 12.5 milliGRAM(s) Oral two times a day  pantoprazole    Tablet 40 milliGRAM(s) Oral before breakfast  senna 2 Tablet(s) Oral at bedtime  ticagrelor 90 milliGRAM(s) Oral every 12 hours    MEDICATIONS  (PRN):  dextrose 40% Gel 15 Gram(s) Oral once PRN Blood Glucose LESS THAN 70 milliGRAM(s)/deciliter  glucagon  Injectable 1 milliGRAM(s) IntraMuscular once PRN Glucose LESS THAN 70 milligrams/deciliter      ALLERGIES:  No Known Allergies      OBJECTIVE:  ICU Vital Signs Last 24 Hrs  T(C): 37.1 (24 Aug 2020 08:44), Max: 37.1 (24 Aug 2020 08:44)  T(F): 98.7 (24 Aug 2020 08:44), Max: 98.7 (24 Aug 2020 08:44)  HR: 69 (24 Aug 2020 10:00) (66 - 99)  BP: 127/78 (24 Aug 2020 10:00) (96/64 - 134/78)  BP(mean): 94 (24 Aug 2020 10:00) (69 - 100)  ABP: --  ABP(mean): --  RR: 13 (24 Aug 2020 10:00) (13 - 34)  SpO2: 100% (24 Aug 2020 10:00) (98% - 100%)      Adult Advanced Hemodynamics Last 24 Hrs  CVP(mm Hg): --  CVP(cm H2O): --  CO: --  CI: --  PA: --  PA(mean): --  PCWP: --  SVR: --  SVRI: --  PVR: --  PVRI: --      CAPILLARY BLOOD GLUCOSE  POCT Blood Glucose.: 179 mg/dL (24 Aug 2020 07:52)  POCT Blood Glucose.: 196 mg/dL (23 Aug 2020 21:12)  POCT Blood Glucose.: 169 mg/dL (23 Aug 2020 16:29)  POCT Blood Glucose.: 228 mg/dL (23 Aug 2020 11:33)      I&O's Summary  23 Aug 2020 07:01  -  24 Aug 2020 07:00  --------------------------------------------------------  IN: 950 mL / OUT: 2440 mL / NET: -1490 mL    24 Aug 2020 07:01  -  24 Aug 2020 10:34  --------------------------------------------------------  IN: 120 mL / OUT: 0 mL / NET: 120 mL      Daily     Daily Weight in k.5 (24 Aug 2020 04:00)    PHYSICAL EXAMINATION:  General: WN/WD NAD  HEENT: PERRLA, EOMI, moist mucous membranes  Neurology: A&Ox3, nonfocal, CHAVEZ x 4  Respiratory: CTA B/L, normal respiratory effort, no wheezes, crackles, rales  CV: RRR, S1S2, no murmurs, rubs or gallops  Abdominal: Soft, NT, ND +BS  Extremities: No edema, + peripheral pulses      LABS:                   12.3   7.35  )-----------( 181      ( 24 Aug 2020 04:30 )             36.3     08-24    134<L>  |  100  |  10  ----------------------------<  171<H>  4.2   |  19<L>  |  0.90    Ca    9.2      24 Aug 2020 04:30  Phos  4.4     08-24  Mg     2.1     08-24    TPro  6.5  /  Alb  3.9  /  TBili  0.5  /  DBili  x   /  AST  73<H>  /  ALT  19  /  AlkPhos  63  08-23    LIVER FUNCTIONS - ( 23 Aug 2020 03:00 )  Alb: 3.9 g/dL / Pro: 6.5 g/dL / ALK PHOS: 63 u/L / ALT: 19 u/L / AST: 73 u/L / GGT: x           PT/INR - ( 24 Aug 2020 04:30 )   PT: 12.4 SEC;   INR: 1.09          PTT - ( 24 Aug 2020 04:30 )  PTT:33.9 SEC  Creatine Kinase, Serum: 199 u/L ( @ 04:30)  CKMB: 8.81 ng/mL ( 04:30)  Creatine Kinase, Serum: 451 u/L ( @ 12:30)  CKMB: 26.10 ng/mL ( @ 12:30)  CKMB Relative Index: 5.8 ( @ 12:30)    CARDIAC MARKERS ( 24 Aug 2020 04:30 )  x     / x     / 199 u/L / 8.81 ng/mL / x      CARDIAC MARKERS ( 23 Aug 2020 12:30 )  x     / x     / 451 u/L / 26.10 ng/mL / x      CARDIAC MARKERS ( 23 Aug 2020 03:00 )  x     / x     / 715 u/L / 49.47 ng/mL / x            TELEMETRY:   NSR w/ HR in the 80s      IMAGIN) TTE  Patient name: UZAIR SMITH  YOB: 1960   Age: 59 (M)   MR#: [id number]  Study Date: 2020  [facility]  Study quality: Technically good  Referring Physician: Ras Ervin MD  Blood Pressure: 95/74 mmHg  Height: 167 cm  Weight: 63 kg  BSA: 1.7 m2  ------------------------------------------------------------------------  PROCEDURE: Transthoracic echocardiogram with 2-D, M-Mode  and complete spectral and color flow Doppler.  INDICATION: Abnormal electrocardiogram (ECG) (EKG) (R94.31)  ------------------------------------------------------------------------  DIMENSIONS:  Dimensions:     Normal Values:  LA:     2.9 cm    2.0 - 4.0 cm  Ao:     3.4 cm    2.0 - 3.8 cm  SEPTUM: 0.6 cm    0.6 - 1.2 cm  PWT:    0.6 cm    0.6 - 1.1 cm  LVIDd:  3.1 cm    3.0 - 5.6 cm  LVIDs:  1.8 cm    1.8 - 4.0 cm  Derived Variables:  LVMI: 25 g/m2  RWT: 0.38  Fractional short: 42 %  Ejection Fraction (Teicholtz): 74 %  ------------------------------------------------------------------------  OBSERVATIONS:  Mitral Valve: Normal mitral valve.  Aortic Root: Normal aortic root.  Aortic Valve: Normal trileaflet aortic valve.  Left Atrium: Normal left atrium.  Left Ventricle: Normal left ventricular systolic function.  No segmental wall motion abnormalities. Normal left  ventricular internal dimensions and wall thicknesses.  (DT:229 ms).  Right Heart: Normal right atrium. Normal right ventricular  size and function. Normal tricuspid valve. Normal pulmonic  valve.  Pericardium/PleuraNormal pericardium with no pericardial  effusion.  ------------------------------------------------------------------------  CONCLUSIONS:  1. Normal mitral valve.  2. Normal trileaflet aortic valve.  3. Normal left ventricular internal dimensions and wall  thicknesses.  4. Normal left ventricular systolic function. No segmental  wall motion abnormalities.  5. Normal right ventricular size and function.  ------------------------------------------------------------------------  Confirmed on  2020 - 15:59:19 by JUAN J Moreno  ------------------------------------------------------------------------        2) CXR    EXAM:  XR CHEST AP OR PA 1V    PROCEDURE DATE:  Aug 23 2020     INTERPRETATION:  CLINICAL INDICATION: non-ST elevation MI    EXAM:  Single frontal chest from 2020 at 0803. Compared to prior study from 2020.    Projection lordotic.  Slightly rotated right.    IMPRESSION:  Clear lungs. No pleural effusions or pneumothorax.  Cardiac and mediastinal silhouettes within normal limits.  Trachea midline.  Unremarkable osseous structures.    DAVIDE LOU M.D., ATTENDING RADIOLOGIST  This document has been electronically signed. Aug 23 2020  4:08PM

## 2020-08-24 NOTE — PROGRESS NOTE ADULT - PROBLEM SELECTOR PLAN 2
-Awaiting staged PCI of OM1  -Will c/w BB as tolerated  -Holding losartan 100 for now, will resume prior to discharge -staged PCI of OM1 today, w/ likely d/c after appropriate monitoring period/ if no complications  -Switching to toprol 25 mg XL daily s/p PCI  -Holding losartan 100 for now, will resume at discharge

## 2020-08-24 NOTE — PROGRESS NOTE ADULT - PROBLEM SELECTOR PLAN 1
Admitted to CCU for continuous tele monitoring  - trend CMP with mag and phos, CBC, Coags, serial cardiac enzymes  - HgbA1c 8.1, TSH 2.96, elevated TG in lipid profile  - s/p cardiac cath 8/22. F/T/h on 8/22 [facility]: Diag 1 99%- POBA to the diag 1, OM1 patent stent subtotal distal occlusion, RCA mid 60% ISR, Ramus is small with severe disease, OM2 prox/mid 80%. Plan for staged PCI of OM1 lesion prior to discharge.  -s/p ASA and brilinta load, will resume asa 81 and brilinta 90 bid  continue lipitor 80  - will start lopressor 12.5 BID today  -Will resume ARB post staged PCI tomorrow as tolerated  - pending echo Admitted to CCU for continuous tele monitoring  - trend CMP with mag and phos, CBC, Coags, serial cardiac enzymes  - HgbA1c 8.1, TSH 2.96, elevated TG in lipid profile  - s/p cardiac cath 8/22. F/T/h on 8/22 [facility]: Diag 1 99%- POBA to the diag 1, OM1 patent stent subtotal distal occlusion, RCA mid 60% ISR, Ramus is small with severe disease, OM2 prox/mid 80%.   -Planned for staged PCI of OM1 lesion today, 8/24, w/ discharge likely after appropriate monitoring period per d/w Dr. Rodriguez  -C/w asa 81 and brilinta 90 bid  -C/w lipitor 80  -Switching to toprol 25mg XL daily s/p PCI   -Resuming losartan at discharge  -ECHO on 8/23 showing normal LVF and EF of 74%